# Patient Record
Sex: FEMALE | Race: WHITE | URBAN - METROPOLITAN AREA
[De-identification: names, ages, dates, MRNs, and addresses within clinical notes are randomized per-mention and may not be internally consistent; named-entity substitution may affect disease eponyms.]

---
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
## 2024-02-03 ENCOUNTER — HOSPITAL ENCOUNTER (EMERGENCY)
Age: 24
Discharge: HOME | End: 2024-02-03
Payer: MEDICAID

## 2024-02-03 VITALS
TEMPERATURE: 99.2 F | OXYGEN SATURATION: 97 % | HEART RATE: 101 BPM | WEIGHT: 118 LBS | RESPIRATION RATE: 20 BRPM | BODY MASS INDEX: 20.91 KG/M2 | DIASTOLIC BLOOD PRESSURE: 70 MMHG | SYSTOLIC BLOOD PRESSURE: 107 MMHG | HEIGHT: 63 IN

## 2024-02-03 DIAGNOSIS — J10.1 INFLUENZA A: Primary | ICD-10-CM

## 2024-02-03 LAB
INFLUENZA A BY PCR: POSITIVE
INFLUENZA B BY PCR: NEGATIVE
SARS-COV-2 RDRP RESP QL NAA+PROBE: NOT DETECTED
STREP GRP A PCR: NEGATIVE

## 2024-02-03 PROCEDURE — 87502 INFLUENZA DNA AMP PROBE: CPT

## 2024-02-03 PROCEDURE — 99283 EMERGENCY DEPT VISIT LOW MDM: CPT

## 2024-02-03 PROCEDURE — 87651 STREP A DNA AMP PROBE: CPT

## 2024-02-03 PROCEDURE — 6370000000 HC RX 637 (ALT 250 FOR IP)

## 2024-02-03 PROCEDURE — 87635 SARS-COV-2 COVID-19 AMP PRB: CPT

## 2024-02-03 RX ORDER — ONDANSETRON 4 MG/1
4 TABLET, ORALLY DISINTEGRATING ORAL ONCE
Status: COMPLETED | OUTPATIENT
Start: 2024-02-03 | End: 2024-02-03

## 2024-02-03 RX ORDER — ONDANSETRON 4 MG/1
4 TABLET, ORALLY DISINTEGRATING ORAL EVERY 8 HOURS PRN
Qty: 9 TABLET | Refills: 0 | Status: SHIPPED | OUTPATIENT
Start: 2024-02-03 | End: 2024-02-06

## 2024-02-03 RX ORDER — IBUPROFEN 600 MG/1
600 TABLET ORAL EVERY 8 HOURS PRN
Qty: 9 TABLET | Refills: 0 | Status: SHIPPED | OUTPATIENT
Start: 2024-02-03 | End: 2024-02-06

## 2024-02-03 RX ORDER — IBUPROFEN 600 MG/1
600 TABLET ORAL ONCE
Status: COMPLETED | OUTPATIENT
Start: 2024-02-03 | End: 2024-02-03

## 2024-02-03 RX ORDER — FLUTICASONE PROPIONATE 50 MCG
2 SPRAY, SUSPENSION (ML) NASAL DAILY
Qty: 16 G | Refills: 0 | Status: SHIPPED | OUTPATIENT
Start: 2024-02-03

## 2024-02-03 RX ADMIN — ONDANSETRON 4 MG: 4 TABLET, ORALLY DISINTEGRATING ORAL at 11:51

## 2024-02-03 RX ADMIN — IBUPROFEN 600 MG: 600 TABLET, FILM COATED ORAL at 12:41

## 2024-02-03 ASSESSMENT — PAIN SCALES - GENERAL: PAINLEVEL_OUTOF10: 9

## 2024-02-03 NOTE — ED PROVIDER NOTES
Discharge 02/03/2024 12:34:51 PM      PATIENT REFERRED TO:  Daniel Archuleta PA  [address] [id number] [phone]            DISCHARGE MEDICATIONS:  New Prescriptions    FLUTICASONE (FLONASE) 50 MCG/ACT NASAL SPRAY    2 sprays by Each Nostril route daily    IBUPROFEN (ADVIL;MOTRIN) 600 MG TABLET    Take 1 tablet by mouth every 8 hours as needed for Pain or Fever    ONDANSETRON (ZOFRAN-ODT) 4 MG DISINTEGRATING TABLET    Take 1 tablet by mouth every 8 hours as needed for Nausea or Vomiting       (Please note that portions of this note were completed with a voice recognition program.  Efforts were made to edit the dictations but occasionally words are mis-transcribed.)    Ninfa Rebolledo, APRN - CNP    Supervising Physician ***      intermittent vomiting, cough, rhinorrhea x head congestion.  Upon assessment, age appropriate responses, no wheezing, no stridor, no nasal flaring noted. She is in no acute distress. Patient medicated in ED.   Patient is influenza positive. Due to symptoms being \"all week\" tamiflu not indicated. Discussed symptom approach. Scripts x 2 sent to pharmacy for Zofran and Flonase. Patient educated on supportive care. Patient given standard anticipatory guidance, return to ED warning signs, strict follow-up guidelines with PCP/Speciality. Patient verbalized understanding of education, instruction. Patient is agreeable to plan. Patient discharged home in stable condition      Medical Decision Making  Amount and/or Complexity of Data Reviewed  Labs: ordered.       Coding     PROCEDURES:    Procedures      FINAL IMPRESSION      1. Influenza A          DISPOSITION/PLAN   DISPOSITION Decision To Discharge 02/03/2024 12:34:51 PM      PATIENT REFERRED TO:  Daniel Archuleta PA  [address] [place], [place] [id number]  [phone]            DISCHARGE MEDICATIONS:  Discharge Medication List as of 2/3/2024 12:35 PM        START taking these medications    Details   ondansetron (ZOFRAN-ODT) 4 MG disintegrating tablet Take 1 tablet by mouth every 8 hours as needed for Nausea or Vomiting, Disp-9 tablet, R-0Normal      ibuprofen (ADVIL;MOTRIN) 600 MG tablet Take 1 tablet by mouth every 8 hours as needed for Pain or Fever, Disp-9 tablet, R-0Normal      fluticasone (FLONASE) 50 MCG/ACT nasal spray 2 sprays by Each Nostril route daily, Disp-16 g, R-0Normal             (Please note that portions of this note were completed with a voice recognition program.  Efforts were made to edit the dictations but occasionally words are mis-transcribed.)    Ninfa Rebolledo, APRN - CNP    Supervising Physician Malorie

## 2024-02-05 ASSESSMENT — ENCOUNTER SYMPTOMS
TROUBLE SWALLOWING: 0
SHORTNESS OF BREATH: 1
VOICE CHANGE: 0
RHINORRHEA: 1
COUGH: 1
COLOR CHANGE: 0
STRIDOR: 0
SORE THROAT: 1
BACK PAIN: 0
WHEEZING: 0
NAUSEA: 1
VOMITING: 1

## 2024-08-31 ENCOUNTER — APPOINTMENT (OUTPATIENT)
Dept: RADIOLOGY | Facility: HOSPITAL | Age: 24
End: 2024-08-31
Payer: MEDICAID

## 2024-08-31 ENCOUNTER — HOSPITAL ENCOUNTER (EMERGENCY)
Facility: HOSPITAL | Age: 24
Discharge: OTHER INSTITUTION | End: 2024-09-01
Attending: STUDENT IN AN ORGANIZED HEALTH CARE EDUCATION/TRAINING PROGRAM
Payer: MEDICAID

## 2024-08-31 VITALS
RESPIRATION RATE: 18 BRPM | BODY MASS INDEX: 19.14 KG/M2 | HEIGHT: 63 IN | TEMPERATURE: 98.8 F | WEIGHT: 108 LBS | SYSTOLIC BLOOD PRESSURE: 146 MMHG | HEART RATE: 108 BPM | DIASTOLIC BLOOD PRESSURE: 79 MMHG | OXYGEN SATURATION: 100 %
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
## 2024-08-31 DIAGNOSIS — S32.592A CLOSED BILATERAL FRACTURE OF PUBIC RAMI, INITIAL ENCOUNTER (MULTI): ICD-10-CM

## 2024-08-31 DIAGNOSIS — S32.591A CLOSED BILATERAL FRACTURE OF PUBIC RAMI, INITIAL ENCOUNTER (MULTI): ICD-10-CM

## 2024-08-31 DIAGNOSIS — S32.10XA CLOSED FRACTURE OF SACRUM, UNSPECIFIED PORTION OF SACRUM, INITIAL ENCOUNTER (MULTI): ICD-10-CM

## 2024-08-31 LAB
ABO GROUP (TYPE) IN BLOOD: NORMAL
ALBUMIN SERPL BCP-MCNC: 4.2 G/DL (ref 3.4–5)
ALP SERPL-CCNC: 35 U/L (ref 33–110)
ALT SERPL W P-5'-P-CCNC: 9 U/L (ref 7–45)
ANION GAP SERPL CALC-SCNC: 11 MMOL/L (ref 10–20)
ANTIBODY SCREEN: NORMAL
AST SERPL W P-5'-P-CCNC: 15 U/L (ref 9–39)
B-HCG SERPL-ACNC: 338 MIU/ML
BASOPHILS # BLD AUTO: 0.03 X10*3/UL (ref 0–0.1)
BASOPHILS NFR BLD AUTO: 0.2 %
BILIRUB SERPL-MCNC: 0.4 MG/DL (ref 0–1.2)
BUN SERPL-MCNC: 12 MG/DL (ref 6–23)
CALCIUM SERPL-MCNC: 8.8 MG/DL (ref 8.6–10.3)
CHLORIDE SERPL-SCNC: 106 MMOL/L (ref 98–107)
CO2 SERPL-SCNC: 23 MMOL/L (ref 21–32)
CREAT SERPL-MCNC: 0.67 MG/DL (ref 0.5–1.05)
EGFRCR SERPLBLD CKD-EPI 2021: >90 ML/MIN/1.73M*2
EOSINOPHIL # BLD AUTO: 0.05 X10*3/UL (ref 0–0.7)
EOSINOPHIL NFR BLD AUTO: 0.4 %
ERYTHROCYTE [DISTWIDTH] IN BLOOD BY AUTOMATED COUNT: 11.9 % (ref 11.5–14.5)
ETHANOL SERPL-MCNC: <10 MG/DL
GLUCOSE SERPL-MCNC: 155 MG/DL (ref 74–99)
HCT VFR BLD AUTO: 39.2 % (ref 36–46)
HGB BLD-MCNC: 13.5 G/DL (ref 12–16)
HOLD SPECIMEN: NORMAL
IMM GRANULOCYTES # BLD AUTO: 0.14 X10*3/UL (ref 0–0.7)
IMM GRANULOCYTES NFR BLD AUTO: 1.1 % (ref 0–0.9)
INR PPP: 1.1 (ref 0.9–1.1)
LACTATE SERPL-SCNC: 1.6 MMOL/L (ref 0.4–2)
LACTATE SERPL-SCNC: 2.1 MMOL/L (ref 0.4–2)
LYMPHOCYTES # BLD AUTO: 3.44 X10*3/UL (ref 1.2–4.8)
LYMPHOCYTES NFR BLD AUTO: 26.9 %
MCH RBC QN AUTO: 31.3 PG (ref 26–34)
MCHC RBC AUTO-ENTMCNC: 34.4 G/DL (ref 32–36)
MCV RBC AUTO: 91 FL (ref 80–100)
MONOCYTES # BLD AUTO: 0.88 X10*3/UL (ref 0.1–1)
MONOCYTES NFR BLD AUTO: 6.9 %
NEUTROPHILS # BLD AUTO: 8.27 X10*3/UL (ref 1.2–7.7)
NEUTROPHILS NFR BLD AUTO: 64.5 %
NRBC BLD-RTO: 0 /100 WBCS (ref 0–0)
PLATELET # BLD AUTO: 220 X10*3/UL (ref 150–450)
POTASSIUM SERPL-SCNC: 3.3 MMOL/L (ref 3.5–5.3)
PROT SERPL-MCNC: 6.7 G/DL (ref 6.4–8.2)
PROTHROMBIN TIME: 12.8 SECONDS (ref 9.8–12.8)
RBC # BLD AUTO: 4.32 X10*6/UL (ref 4–5.2)
RH FACTOR (ANTIGEN D): NORMAL
SODIUM SERPL-SCNC: 137 MMOL/L (ref 136–145)
WBC # BLD AUTO: 12.8 X10*3/UL (ref 4.4–11.3)

## 2024-08-31 PROCEDURE — 83605 ASSAY OF LACTIC ACID: CPT | Performed by: STUDENT IN AN ORGANIZED HEALTH CARE EDUCATION/TRAINING PROGRAM

## 2024-08-31 PROCEDURE — 72125 CT NECK SPINE W/O DYE: CPT | Performed by: RADIOLOGY

## 2024-08-31 PROCEDURE — 99291 CRITICAL CARE FIRST HOUR: CPT | Performed by: STUDENT IN AN ORGANIZED HEALTH CARE EDUCATION/TRAINING PROGRAM

## 2024-08-31 PROCEDURE — 72128 CT CHEST SPINE W/O DYE: CPT | Mod: RCN

## 2024-08-31 PROCEDURE — 73502 X-RAY EXAM HIP UNI 2-3 VIEWS: CPT | Mod: RT

## 2024-08-31 PROCEDURE — 84702 CHORIONIC GONADOTROPIN TEST: CPT | Performed by: STUDENT IN AN ORGANIZED HEALTH CARE EDUCATION/TRAINING PROGRAM

## 2024-08-31 PROCEDURE — 36415 COLL VENOUS BLD VENIPUNCTURE: CPT | Performed by: STUDENT IN AN ORGANIZED HEALTH CARE EDUCATION/TRAINING PROGRAM

## 2024-08-31 PROCEDURE — 74178 CT ABD&PLV WO CNTR FLWD CNTR: CPT | Performed by: STUDENT IN AN ORGANIZED HEALTH CARE EDUCATION/TRAINING PROGRAM

## 2024-08-31 PROCEDURE — 96365 THER/PROPH/DIAG IV INF INIT: CPT | Mod: 59 | Performed by: STUDENT IN AN ORGANIZED HEALTH CARE EDUCATION/TRAINING PROGRAM

## 2024-08-31 PROCEDURE — 71260 CT THORAX DX C+: CPT | Performed by: STUDENT IN AN ORGANIZED HEALTH CARE EDUCATION/TRAINING PROGRAM

## 2024-08-31 PROCEDURE — 74177 CT ABD & PELVIS W/CONTRAST: CPT

## 2024-08-31 PROCEDURE — 2500000004 HC RX 250 GENERAL PHARMACY W/ HCPCS (ALT 636 FOR OP/ED): Performed by: STUDENT IN AN ORGANIZED HEALTH CARE EDUCATION/TRAINING PROGRAM

## 2024-08-31 PROCEDURE — 72131 CT LUMBAR SPINE W/O DYE: CPT | Performed by: STUDENT IN AN ORGANIZED HEALTH CARE EDUCATION/TRAINING PROGRAM

## 2024-08-31 PROCEDURE — 71045 X-RAY EXAM CHEST 1 VIEW: CPT | Performed by: STUDENT IN AN ORGANIZED HEALTH CARE EDUCATION/TRAINING PROGRAM

## 2024-08-31 PROCEDURE — 71045 X-RAY EXAM CHEST 1 VIEW: CPT

## 2024-08-31 PROCEDURE — 76377 3D RENDER W/INTRP POSTPROCES: CPT

## 2024-08-31 PROCEDURE — 72192 CT PELVIS W/O DYE: CPT | Mod: RCN

## 2024-08-31 PROCEDURE — 76377 3D RENDER W/INTRP POSTPROCES: CPT | Performed by: STUDENT IN AN ORGANIZED HEALTH CARE EDUCATION/TRAINING PROGRAM

## 2024-08-31 PROCEDURE — 80053 COMPREHEN METABOLIC PANEL: CPT | Performed by: STUDENT IN AN ORGANIZED HEALTH CARE EDUCATION/TRAINING PROGRAM

## 2024-08-31 PROCEDURE — 85025 COMPLETE CBC W/AUTO DIFF WBC: CPT | Performed by: STUDENT IN AN ORGANIZED HEALTH CARE EDUCATION/TRAINING PROGRAM

## 2024-08-31 PROCEDURE — 70450 CT HEAD/BRAIN W/O DYE: CPT

## 2024-08-31 PROCEDURE — 72125 CT NECK SPINE W/O DYE: CPT

## 2024-08-31 PROCEDURE — 70450 CT HEAD/BRAIN W/O DYE: CPT | Performed by: RADIOLOGY

## 2024-08-31 PROCEDURE — G0390 TRAUMA RESPONS W/HOSP CRITI: HCPCS

## 2024-08-31 PROCEDURE — 86901 BLOOD TYPING SEROLOGIC RH(D): CPT | Performed by: STUDENT IN AN ORGANIZED HEALTH CARE EDUCATION/TRAINING PROGRAM

## 2024-08-31 PROCEDURE — 2550000001 HC RX 255 CONTRASTS: Performed by: STUDENT IN AN ORGANIZED HEALTH CARE EDUCATION/TRAINING PROGRAM

## 2024-08-31 PROCEDURE — 72128 CT CHEST SPINE W/O DYE: CPT | Performed by: STUDENT IN AN ORGANIZED HEALTH CARE EDUCATION/TRAINING PROGRAM

## 2024-08-31 PROCEDURE — 82077 ASSAY SPEC XCP UR&BREATH IA: CPT | Performed by: STUDENT IN AN ORGANIZED HEALTH CARE EDUCATION/TRAINING PROGRAM

## 2024-08-31 PROCEDURE — 72131 CT LUMBAR SPINE W/O DYE: CPT | Mod: RCN

## 2024-08-31 PROCEDURE — 96376 TX/PRO/DX INJ SAME DRUG ADON: CPT | Mod: 59 | Performed by: STUDENT IN AN ORGANIZED HEALTH CARE EDUCATION/TRAINING PROGRAM

## 2024-08-31 PROCEDURE — 73502 X-RAY EXAM HIP UNI 2-3 VIEWS: CPT | Mod: RIGHT SIDE | Performed by: STUDENT IN AN ORGANIZED HEALTH CARE EDUCATION/TRAINING PROGRAM

## 2024-08-31 PROCEDURE — 85610 PROTHROMBIN TIME: CPT | Performed by: STUDENT IN AN ORGANIZED HEALTH CARE EDUCATION/TRAINING PROGRAM

## 2024-08-31 PROCEDURE — 96375 TX/PRO/DX INJ NEW DRUG ADDON: CPT | Mod: 59 | Performed by: STUDENT IN AN ORGANIZED HEALTH CARE EDUCATION/TRAINING PROGRAM

## 2024-08-31 PROCEDURE — 72170 X-RAY EXAM OF PELVIS: CPT

## 2024-08-31 RX ORDER — FENTANYL CITRATE 50 UG/ML
INJECTION, SOLUTION INTRAMUSCULAR; INTRAVENOUS
Status: COMPLETED
Start: 2024-08-31 | End: 2024-08-31

## 2024-08-31 RX ORDER — HYDROMORPHONE HYDROCHLORIDE 1 MG/ML
1 INJECTION, SOLUTION INTRAMUSCULAR; INTRAVENOUS; SUBCUTANEOUS ONCE
Status: COMPLETED | OUTPATIENT
Start: 2024-08-31 | End: 2024-08-31

## 2024-08-31 RX ORDER — FENTANYL CITRATE 50 UG/ML
50 INJECTION, SOLUTION INTRAMUSCULAR; INTRAVENOUS ONCE
Status: COMPLETED | OUTPATIENT
Start: 2024-08-31 | End: 2024-08-31

## 2024-08-31 ASSESSMENT — PAIN DESCRIPTION - LOCATION: LOCATION: HIP

## 2024-08-31 ASSESSMENT — LIFESTYLE VARIABLES
TOTAL SCORE: 0
EVER HAD A DRINK FIRST THING IN THE MORNING TO STEADY YOUR NERVES TO GET RID OF A HANGOVER: NO
HAVE YOU EVER FELT YOU SHOULD CUT DOWN ON YOUR DRINKING: NO
EVER FELT BAD OR GUILTY ABOUT YOUR DRINKING: NO
HAVE PEOPLE ANNOYED YOU BY CRITICIZING YOUR DRINKING: NO

## 2024-08-31 ASSESSMENT — PAIN DESCRIPTION - PAIN TYPE: TYPE: ACUTE PAIN

## 2024-08-31 ASSESSMENT — PAIN SCALES - GENERAL
PAINLEVEL_OUTOF10: 10 - WORST POSSIBLE PAIN

## 2024-08-31 ASSESSMENT — PAIN - FUNCTIONAL ASSESSMENT: PAIN_FUNCTIONAL_ASSESSMENT: 0-10

## 2024-08-31 ASSESSMENT — PAIN DESCRIPTION - ORIENTATION: ORIENTATION: RIGHT

## 2024-09-01 ENCOUNTER — APPOINTMENT (OUTPATIENT)
Dept: RADIOLOGY | Facility: HOSPITAL | Age: 24
End: 2024-09-01
Payer: MEDICAID

## 2024-09-01 ENCOUNTER — ANESTHESIA EVENT (OUTPATIENT)
Dept: OPERATING ROOM | Facility: HOSPITAL | Age: 24
End: 2024-09-01
Payer: MEDICAID

## 2024-09-01 ENCOUNTER — HOSPITAL ENCOUNTER (INPATIENT)
Facility: HOSPITAL | Age: 24
End: 2024-09-01
Attending: EMERGENCY MEDICINE | Admitting: SURGERY
Payer: MEDICAID

## 2024-09-01 ENCOUNTER — CLINICAL SUPPORT (OUTPATIENT)
Dept: EMERGENCY MEDICINE | Facility: HOSPITAL | Age: 24
End: 2024-09-01
Payer: MEDICAID

## 2024-09-01 ENCOUNTER — ANESTHESIA (OUTPATIENT)
Dept: OPERATING ROOM | Facility: HOSPITAL | Age: 24
End: 2024-09-01
Payer: MEDICAID

## 2024-09-01 VITALS
OXYGEN SATURATION: 100 % | WEIGHT: 108 LBS | HEIGHT: 63 IN | TEMPERATURE: 99 F | BODY MASS INDEX: 19.14 KG/M2 | SYSTOLIC BLOOD PRESSURE: 133 MMHG | RESPIRATION RATE: 14 BRPM | HEART RATE: 83 BPM | DIASTOLIC BLOOD PRESSURE: 76 MMHG

## 2024-09-01 DIAGNOSIS — S32.810A PELVIC RING FRACTURE, CLOSED, INITIAL ENCOUNTER (MULTI): ICD-10-CM

## 2024-09-01 DIAGNOSIS — S32.9XXA CLOSED DISPLACED FRACTURE OF PELVIS, UNSPECIFIED PART OF PELVIS, INITIAL ENCOUNTER (MULTI): Primary | ICD-10-CM

## 2024-09-01 PROBLEM — O36.80X0 PREGNANCY OF UNKNOWN ANATOMIC LOCATION (HHS-HCC): Status: ACTIVE | Noted: 2024-09-01

## 2024-09-01 LAB
ABO GROUP (TYPE) IN BLOOD: NORMAL
ALBUMIN SERPL BCP-MCNC: 4.1 G/DL (ref 3.4–5)
ALP SERPL-CCNC: 33 U/L (ref 33–110)
ALT SERPL W P-5'-P-CCNC: 11 U/L (ref 7–45)
ANION GAP SERPL CALC-SCNC: 13 MMOL/L (ref 10–20)
ANTIBODY SCREEN: NORMAL
AST SERPL W P-5'-P-CCNC: 16 U/L (ref 9–39)
ATRIAL RATE: 96 BPM
BASOPHILS # BLD AUTO: 0.03 X10*3/UL (ref 0–0.1)
BASOPHILS NFR BLD AUTO: 0.2 %
BILIRUB SERPL-MCNC: 0.7 MG/DL (ref 0–1.2)
BUN SERPL-MCNC: 11 MG/DL (ref 6–23)
CALCIUM SERPL-MCNC: 8.7 MG/DL (ref 8.6–10.6)
CHLORIDE SERPL-SCNC: 107 MMOL/L (ref 98–107)
CO2 SERPL-SCNC: 22 MMOL/L (ref 21–32)
CREAT SERPL-MCNC: 0.52 MG/DL (ref 0.5–1.05)
EGFRCR SERPLBLD CKD-EPI 2021: >90 ML/MIN/1.73M*2
EOSINOPHIL # BLD AUTO: 0 X10*3/UL (ref 0–0.7)
EOSINOPHIL NFR BLD AUTO: 0 %
ERYTHROCYTE [DISTWIDTH] IN BLOOD BY AUTOMATED COUNT: 11.9 % (ref 11.5–14.5)
ERYTHROCYTE [DISTWIDTH] IN BLOOD BY AUTOMATED COUNT: 12 % (ref 11.5–14.5)
GLUCOSE SERPL-MCNC: 115 MG/DL (ref 74–99)
HCT VFR BLD AUTO: 31 % (ref 36–46)
HCT VFR BLD AUTO: 34.4 % (ref 36–46)
HGB BLD-MCNC: 11 G/DL (ref 12–16)
HGB BLD-MCNC: 12.3 G/DL (ref 12–16)
IMM GRANULOCYTES # BLD AUTO: 0.1 X10*3/UL (ref 0–0.7)
IMM GRANULOCYTES NFR BLD AUTO: 0.7 % (ref 0–0.9)
INR PPP: 1.2 (ref 0.9–1.1)
LACTATE SERPL-SCNC: 1.4 MMOL/L (ref 0.4–2)
LYMPHOCYTES # BLD AUTO: 1.03 X10*3/UL (ref 1.2–4.8)
LYMPHOCYTES NFR BLD AUTO: 7 %
MCH RBC QN AUTO: 30.5 PG (ref 26–34)
MCH RBC QN AUTO: 30.9 PG (ref 26–34)
MCHC RBC AUTO-ENTMCNC: 35.5 G/DL (ref 32–36)
MCHC RBC AUTO-ENTMCNC: 35.8 G/DL (ref 32–36)
MCV RBC AUTO: 85 FL (ref 80–100)
MCV RBC AUTO: 87 FL (ref 80–100)
MONOCYTES # BLD AUTO: 1.29 X10*3/UL (ref 0.1–1)
MONOCYTES NFR BLD AUTO: 8.8 %
NEUTROPHILS # BLD AUTO: 12.28 X10*3/UL (ref 1.2–7.7)
NEUTROPHILS NFR BLD AUTO: 83.3 %
NRBC BLD-RTO: 0 /100 WBCS (ref 0–0)
NRBC BLD-RTO: 0 /100 WBCS (ref 0–0)
P AXIS: 43 DEGREES
P OFFSET: 204 MS
P ONSET: 152 MS
PLATELET # BLD AUTO: 195 X10*3/UL (ref 150–450)
PLATELET # BLD AUTO: 222 X10*3/UL (ref 150–450)
POTASSIUM SERPL-SCNC: 3.6 MMOL/L (ref 3.5–5.3)
PR INTERVAL: 132 MS
PROT SERPL-MCNC: 6.4 G/DL (ref 6.4–8.2)
PROTHROMBIN TIME: 13.5 SECONDS (ref 9.8–12.8)
Q ONSET: 218 MS
QRS COUNT: 16 BEATS
QRS DURATION: 88 MS
QT INTERVAL: 374 MS
QTC CALCULATION(BAZETT): 472 MS
QTC FREDERICIA: 437 MS
R AXIS: 78 DEGREES
RBC # BLD AUTO: 3.56 X10*6/UL (ref 4–5.2)
RBC # BLD AUTO: 4.03 X10*6/UL (ref 4–5.2)
RH FACTOR (ANTIGEN D): NORMAL
SODIUM SERPL-SCNC: 138 MMOL/L (ref 136–145)
T AXIS: 27 DEGREES
T OFFSET: 405 MS
VENTRICULAR RATE: 96 BPM
WBC # BLD AUTO: 10.5 X10*3/UL (ref 4.4–11.3)
WBC # BLD AUTO: 14.7 X10*3/UL (ref 4.4–11.3)

## 2024-09-01 PROCEDURE — 93005 ELECTROCARDIOGRAM TRACING: CPT

## 2024-09-01 PROCEDURE — 0QS134Z REPOSITION SACRUM WITH INTERNAL FIXATION DEVICE, PERCUTANEOUS APPROACH: ICD-10-PCS | Performed by: STUDENT IN AN ORGANIZED HEALTH CARE EDUCATION/TRAINING PROGRAM

## 2024-09-01 PROCEDURE — 2500000005 HC RX 250 GENERAL PHARMACY W/O HCPCS

## 2024-09-01 PROCEDURE — 0QH135Z INSERTION OF EXTERNAL FIXATION DEVICE INTO SACRUM, PERCUTANEOUS APPROACH: ICD-10-PCS | Performed by: STUDENT IN AN ORGANIZED HEALTH CARE EDUCATION/TRAINING PROGRAM

## 2024-09-01 PROCEDURE — 3600000009 HC OR TIME - EACH INCREMENTAL 1 MINUTE - PROCEDURE LEVEL FOUR: Performed by: STUDENT IN AN ORGANIZED HEALTH CARE EDUCATION/TRAINING PROGRAM

## 2024-09-01 PROCEDURE — 2W5NX0Z REMOVAL OF TRACTION APPARATUS ON RIGHT UPPER LEG: ICD-10-PCS | Performed by: STUDENT IN AN ORGANIZED HEALTH CARE EDUCATION/TRAINING PROGRAM

## 2024-09-01 PROCEDURE — 0QS234Z REPOSITION RIGHT PELVIC BONE WITH INTERNAL FIXATION DEVICE, PERCUTANEOUS APPROACH: ICD-10-PCS | Performed by: STUDENT IN AN ORGANIZED HEALTH CARE EDUCATION/TRAINING PROGRAM

## 2024-09-01 PROCEDURE — 20650 INSERT AND REMOVE BONE PIN: CPT | Performed by: STUDENT IN AN ORGANIZED HEALTH CARE EDUCATION/TRAINING PROGRAM

## 2024-09-01 PROCEDURE — A6213 FOAM DRG >16<=48 SQ IN W/BDR: HCPCS | Performed by: STUDENT IN AN ORGANIZED HEALTH CARE EDUCATION/TRAINING PROGRAM

## 2024-09-01 PROCEDURE — 73560 X-RAY EXAM OF KNEE 1 OR 2: CPT | Mod: RT

## 2024-09-01 PROCEDURE — 85025 COMPLETE CBC W/AUTO DIFF WBC: CPT

## 2024-09-01 PROCEDURE — 0QS334Z REPOSITION LEFT PELVIC BONE WITH INTERNAL FIXATION DEVICE, PERCUTANEOUS APPROACH: ICD-10-PCS | Performed by: STUDENT IN AN ORGANIZED HEALTH CARE EDUCATION/TRAINING PROGRAM

## 2024-09-01 PROCEDURE — 96376 TX/PRO/DX INJ SAME DRUG ADON: CPT | Mod: 59

## 2024-09-01 PROCEDURE — 2500000004 HC RX 250 GENERAL PHARMACY W/ HCPCS (ALT 636 FOR OP/ED)

## 2024-09-01 PROCEDURE — 27216 TREAT PELVIC RING FRACTURE: CPT | Performed by: STUDENT IN AN ORGANIZED HEALTH CARE EDUCATION/TRAINING PROGRAM

## 2024-09-01 PROCEDURE — C1776 JOINT DEVICE (IMPLANTABLE): HCPCS | Performed by: STUDENT IN AN ORGANIZED HEALTH CARE EDUCATION/TRAINING PROGRAM

## 2024-09-01 PROCEDURE — 72170 X-RAY EXAM OF PELVIS: CPT

## 2024-09-01 PROCEDURE — 3700000001 HC GENERAL ANESTHESIA TIME - INITIAL BASE CHARGE: Performed by: STUDENT IN AN ORGANIZED HEALTH CARE EDUCATION/TRAINING PROGRAM

## 2024-09-01 PROCEDURE — 2500000001 HC RX 250 WO HCPCS SELF ADMINISTERED DRUGS (ALT 637 FOR MEDICARE OP): Performed by: ANESTHESIOLOGY

## 2024-09-01 PROCEDURE — 2500000004 HC RX 250 GENERAL PHARMACY W/ HCPCS (ALT 636 FOR OP/ED): Performed by: NURSE PRACTITIONER

## 2024-09-01 PROCEDURE — 80053 COMPREHEN METABOLIC PANEL: CPT

## 2024-09-01 PROCEDURE — 0QS235Z REPOSITION RIGHT PELVIC BONE WITH EXTERNAL FIXATION DEVICE, PERCUTANEOUS APPROACH: ICD-10-PCS | Performed by: EMERGENCY MEDICINE

## 2024-09-01 PROCEDURE — 99291 CRITICAL CARE FIRST HOUR: CPT | Mod: 25 | Performed by: EMERGENCY MEDICINE

## 2024-09-01 PROCEDURE — A27216 PR PERCUT FIX POST PELV RING FX

## 2024-09-01 PROCEDURE — 99253 IP/OBS CNSLTJ NEW/EST LOW 45: CPT

## 2024-09-01 PROCEDURE — 83605 ASSAY OF LACTIC ACID: CPT

## 2024-09-01 PROCEDURE — 2500000004 HC RX 250 GENERAL PHARMACY W/ HCPCS (ALT 636 FOR OP/ED): Performed by: STUDENT IN AN ORGANIZED HEALTH CARE EDUCATION/TRAINING PROGRAM

## 2024-09-01 PROCEDURE — C1713 ANCHOR/SCREW BN/BN,TIS/BN: HCPCS | Performed by: STUDENT IN AN ORGANIZED HEALTH CARE EDUCATION/TRAINING PROGRAM

## 2024-09-01 PROCEDURE — 99291 CRITICAL CARE FIRST HOUR: CPT | Performed by: NURSE PRACTITIONER

## 2024-09-01 PROCEDURE — 7100000002 HC RECOVERY ROOM TIME - EACH INCREMENTAL 1 MINUTE: Performed by: STUDENT IN AN ORGANIZED HEALTH CARE EDUCATION/TRAINING PROGRAM

## 2024-09-01 PROCEDURE — 0QH335Z INSERTION OF EXTERNAL FIXATION DEVICE INTO LEFT PELVIC BONE, PERCUTANEOUS APPROACH: ICD-10-PCS | Performed by: STUDENT IN AN ORGANIZED HEALTH CARE EDUCATION/TRAINING PROGRAM

## 2024-09-01 PROCEDURE — 73523 X-RAY EXAM HIPS BI 5/> VIEWS: CPT

## 2024-09-01 PROCEDURE — 85610 PROTHROMBIN TIME: CPT

## 2024-09-01 PROCEDURE — G0390 TRAUMA RESPONS W/HOSP CRITI: HCPCS

## 2024-09-01 PROCEDURE — 2500000005 HC RX 250 GENERAL PHARMACY W/O HCPCS: Performed by: ANESTHESIOLOGIST ASSISTANT

## 2024-09-01 PROCEDURE — 2500000004 HC RX 250 GENERAL PHARMACY W/ HCPCS (ALT 636 FOR OP/ED): Mod: SE

## 2024-09-01 PROCEDURE — 2720000007 HC OR 272 NO HCPCS: Performed by: STUDENT IN AN ORGANIZED HEALTH CARE EDUCATION/TRAINING PROGRAM

## 2024-09-01 PROCEDURE — 0QH235Z INSERTION OF EXTERNAL FIXATION DEVICE INTO RIGHT PELVIC BONE, PERCUTANEOUS APPROACH: ICD-10-PCS | Performed by: STUDENT IN AN ORGANIZED HEALTH CARE EDUCATION/TRAINING PROGRAM

## 2024-09-01 PROCEDURE — 20692 APPL MLTPLN UNI EXT FIXJ SYS: CPT | Performed by: STUDENT IN AN ORGANIZED HEALTH CARE EDUCATION/TRAINING PROGRAM

## 2024-09-01 PROCEDURE — 3700000002 HC GENERAL ANESTHESIA TIME - EACH INCREMENTAL 1 MINUTE: Performed by: STUDENT IN AN ORGANIZED HEALTH CARE EDUCATION/TRAINING PROGRAM

## 2024-09-01 PROCEDURE — 2500000001 HC RX 250 WO HCPCS SELF ADMINISTERED DRUGS (ALT 637 FOR MEDICARE OP): Performed by: NURSE PRACTITIONER

## 2024-09-01 PROCEDURE — 2500000005 HC RX 250 GENERAL PHARMACY W/O HCPCS: Performed by: STUDENT IN AN ORGANIZED HEALTH CARE EDUCATION/TRAINING PROGRAM

## 2024-09-01 PROCEDURE — 36620 INSERTION CATHETER ARTERY: CPT | Performed by: ANESTHESIOLOGY

## 2024-09-01 PROCEDURE — 36415 COLL VENOUS BLD VENIPUNCTURE: CPT

## 2024-09-01 PROCEDURE — 85027 COMPLETE CBC AUTOMATED: CPT | Performed by: NURSE PRACTITIONER

## 2024-09-01 PROCEDURE — 86901 BLOOD TYPING SEROLOGIC RH(D): CPT | Performed by: NURSE PRACTITIONER

## 2024-09-01 PROCEDURE — 3600000004 HC OR TIME - INITIAL BASE CHARGE - PROCEDURE LEVEL FOUR: Performed by: STUDENT IN AN ORGANIZED HEALTH CARE EDUCATION/TRAINING PROGRAM

## 2024-09-01 PROCEDURE — 2500000004 HC RX 250 GENERAL PHARMACY W/ HCPCS (ALT 636 FOR OP/ED): Mod: JZ

## 2024-09-01 PROCEDURE — 1100000001 HC PRIVATE ROOM DAILY

## 2024-09-01 PROCEDURE — 2500000004 HC RX 250 GENERAL PHARMACY W/ HCPCS (ALT 636 FOR OP/ED): Performed by: ANESTHESIOLOGIST ASSISTANT

## 2024-09-01 PROCEDURE — 7100000001 HC RECOVERY ROOM TIME - INITIAL BASE CHARGE: Performed by: STUDENT IN AN ORGANIZED HEALTH CARE EDUCATION/TRAINING PROGRAM

## 2024-09-01 PROCEDURE — 2780000003 HC OR 278 NO HCPCS: Performed by: STUDENT IN AN ORGANIZED HEALTH CARE EDUCATION/TRAINING PROGRAM

## 2024-09-01 PROCEDURE — 36415 COLL VENOUS BLD VENIPUNCTURE: CPT | Performed by: NURSE PRACTITIONER

## 2024-09-01 PROCEDURE — 96374 THER/PROPH/DIAG INJ IV PUSH: CPT | Mod: 59

## 2024-09-01 PROCEDURE — A27216 PR PERCUT FIX POST PELV RING FX: Performed by: ANESTHESIOLOGY

## 2024-09-01 PROCEDURE — 51702 INSERT TEMP BLADDER CATH: CPT

## 2024-09-01 PROCEDURE — 96375 TX/PRO/DX INJ NEW DRUG ADDON: CPT | Mod: 59

## 2024-09-01 PROCEDURE — 99291 CRITICAL CARE FIRST HOUR: CPT | Performed by: EMERGENCY MEDICINE

## 2024-09-01 PROCEDURE — 2500000004 HC RX 250 GENERAL PHARMACY W/ HCPCS (ALT 636 FOR OP/ED): Performed by: ANESTHESIOLOGY

## 2024-09-01 DEVICE — ROD, CONNECTING 11 X 250 HOFFMANN3: Type: IMPLANTABLE DEVICE | Site: PELVIS | Status: FUNCTIONAL

## 2024-09-01 DEVICE — CAP, PROTECTIVE 5MM BLUE: Type: IMPLANTABLE DEVICE | Site: PELVIS | Status: FUNCTIONAL

## 2024-09-01 DEVICE — IMPLANTABLE DEVICE: Type: IMPLANTABLE DEVICE | Site: PELVIS | Status: FUNCTIONAL

## 2024-09-01 DEVICE — WASHER, F/6.5/8.0 SCREW SS: Type: IMPLANTABLE DEVICE | Site: PELVIS | Status: FUNCTIONAL

## 2024-09-01 RX ORDER — MIDAZOLAM HYDROCHLORIDE 1 MG/ML
1 INJECTION INTRAMUSCULAR; INTRAVENOUS ONCE
Status: COMPLETED | OUTPATIENT
Start: 2024-09-01 | End: 2024-09-01

## 2024-09-01 RX ORDER — ONDANSETRON HYDROCHLORIDE 2 MG/ML
4 INJECTION, SOLUTION INTRAVENOUS ONCE
Status: COMPLETED | OUTPATIENT
Start: 2024-09-01 | End: 2024-09-01

## 2024-09-01 RX ORDER — SODIUM CHLORIDE, SODIUM LACTATE, POTASSIUM CHLORIDE, CALCIUM CHLORIDE 600; 310; 30; 20 MG/100ML; MG/100ML; MG/100ML; MG/100ML
100 INJECTION, SOLUTION INTRAVENOUS CONTINUOUS
Status: DISCONTINUED | OUTPATIENT
Start: 2024-09-01 | End: 2024-09-01 | Stop reason: HOSPADM

## 2024-09-01 RX ORDER — DIPHENHYDRAMINE HYDROCHLORIDE 50 MG/ML
12.5 INJECTION INTRAMUSCULAR; INTRAVENOUS ONCE AS NEEDED
Status: DISCONTINUED | OUTPATIENT
Start: 2024-09-01 | End: 2024-09-01 | Stop reason: HOSPADM

## 2024-09-01 RX ORDER — CEFAZOLIN 1 G/1
INJECTION, POWDER, FOR SOLUTION INTRAVENOUS AS NEEDED
Status: DISCONTINUED | OUTPATIENT
Start: 2024-09-01 | End: 2024-09-01

## 2024-09-01 RX ORDER — NEOSTIGMINE METHYLSULFATE 1 MG/ML
INJECTION INTRAVENOUS AS NEEDED
Status: DISCONTINUED | OUTPATIENT
Start: 2024-09-01 | End: 2024-09-01

## 2024-09-01 RX ORDER — HYDROMORPHONE HYDROCHLORIDE 1 MG/ML
1 INJECTION, SOLUTION INTRAMUSCULAR; INTRAVENOUS; SUBCUTANEOUS ONCE
Status: COMPLETED | OUTPATIENT
Start: 2024-09-01 | End: 2024-09-01

## 2024-09-01 RX ORDER — SODIUM CHLORIDE, SODIUM LACTATE, POTASSIUM CHLORIDE, CALCIUM CHLORIDE 600; 310; 30; 20 MG/100ML; MG/100ML; MG/100ML; MG/100ML
75 INJECTION, SOLUTION INTRAVENOUS CONTINUOUS
Status: DISCONTINUED | OUTPATIENT
Start: 2024-09-01 | End: 2024-09-03

## 2024-09-01 RX ORDER — ONDANSETRON HYDROCHLORIDE 2 MG/ML
4 INJECTION, SOLUTION INTRAVENOUS ONCE AS NEEDED
Status: COMPLETED | OUTPATIENT
Start: 2024-09-01 | End: 2024-09-01

## 2024-09-01 RX ORDER — OXYCODONE HYDROCHLORIDE 5 MG/1
10 TABLET ORAL EVERY 4 HOURS PRN
Status: DISCONTINUED | OUTPATIENT
Start: 2024-09-01 | End: 2024-09-01 | Stop reason: HOSPADM

## 2024-09-01 RX ORDER — FENTANYL CITRATE 50 UG/ML
INJECTION, SOLUTION INTRAMUSCULAR; INTRAVENOUS AS NEEDED
Status: DISCONTINUED | OUTPATIENT
Start: 2024-09-01 | End: 2024-09-01

## 2024-09-01 RX ORDER — FENTANYL CITRATE 50 UG/ML
1 INJECTION, SOLUTION INTRAMUSCULAR; INTRAVENOUS ONCE
Status: COMPLETED | OUTPATIENT
Start: 2024-09-01 | End: 2024-09-01

## 2024-09-01 RX ORDER — HYDROMORPHONE HYDROCHLORIDE 1 MG/ML
0.2 INJECTION, SOLUTION INTRAMUSCULAR; INTRAVENOUS; SUBCUTANEOUS EVERY 4 HOURS PRN
Status: DISCONTINUED | OUTPATIENT
Start: 2024-09-01 | End: 2024-09-02

## 2024-09-01 RX ORDER — OXYCODONE HYDROCHLORIDE 5 MG/1
10 TABLET ORAL EVERY 4 HOURS PRN
Status: DISCONTINUED | OUTPATIENT
Start: 2024-09-01 | End: 2024-09-05

## 2024-09-01 RX ORDER — OXYCODONE HYDROCHLORIDE 5 MG/1
5 TABLET ORAL EVERY 4 HOURS PRN
Status: DISCONTINUED | OUTPATIENT
Start: 2024-09-01 | End: 2024-09-01 | Stop reason: HOSPADM

## 2024-09-01 RX ORDER — AMOXICILLIN 250 MG
2 CAPSULE ORAL NIGHTLY
Status: DISCONTINUED | OUTPATIENT
Start: 2024-09-01 | End: 2024-09-08 | Stop reason: HOSPADM

## 2024-09-01 RX ORDER — PROPOFOL 10 MG/ML
INJECTION, EMULSION INTRAVENOUS AS NEEDED
Status: DISCONTINUED | OUTPATIENT
Start: 2024-09-01 | End: 2024-09-01

## 2024-09-01 RX ORDER — FENTANYL CITRATE 50 UG/ML
50 INJECTION, SOLUTION INTRAMUSCULAR; INTRAVENOUS ONCE
Status: COMPLETED | OUTPATIENT
Start: 2024-09-01 | End: 2024-09-01

## 2024-09-01 RX ORDER — ATROPINE SULFATE 0.1 MG/ML
INJECTION INTRAVENOUS AS NEEDED
Status: DISCONTINUED | OUTPATIENT
Start: 2024-09-01 | End: 2024-09-01

## 2024-09-01 RX ORDER — HYDROMORPHONE HYDROCHLORIDE 1 MG/ML
0.5 INJECTION, SOLUTION INTRAMUSCULAR; INTRAVENOUS; SUBCUTANEOUS ONCE
Status: COMPLETED | OUTPATIENT
Start: 2024-09-01 | End: 2024-09-01

## 2024-09-01 RX ORDER — HYDROMORPHONE HYDROCHLORIDE 1 MG/ML
0.2 INJECTION, SOLUTION INTRAMUSCULAR; INTRAVENOUS; SUBCUTANEOUS EVERY 5 MIN PRN
Status: DISCONTINUED | OUTPATIENT
Start: 2024-09-01 | End: 2024-09-01 | Stop reason: HOSPADM

## 2024-09-01 RX ORDER — ACETAMINOPHEN 325 MG/1
975 TABLET ORAL EVERY 6 HOURS SCHEDULED
Status: DISCONTINUED | OUTPATIENT
Start: 2024-09-01 | End: 2024-09-04

## 2024-09-01 RX ORDER — ENOXAPARIN SODIUM 100 MG/ML
30 INJECTION SUBCUTANEOUS EVERY 12 HOURS
Status: DISCONTINUED | OUTPATIENT
Start: 2024-09-01 | End: 2024-09-08 | Stop reason: HOSPADM

## 2024-09-01 RX ORDER — CEFAZOLIN SODIUM 2 G/100ML
2 INJECTION, SOLUTION INTRAVENOUS EVERY 8 HOURS
Status: COMPLETED | OUTPATIENT
Start: 2024-09-01 | End: 2024-09-02

## 2024-09-01 RX ORDER — ACETAMINOPHEN 325 MG/1
975 TABLET ORAL ONCE
Status: DISCONTINUED | OUTPATIENT
Start: 2024-09-01 | End: 2024-09-01 | Stop reason: HOSPADM

## 2024-09-01 RX ORDER — SODIUM CHLORIDE 0.9 G/100ML
IRRIGANT IRRIGATION AS NEEDED
Status: DISCONTINUED | OUTPATIENT
Start: 2024-09-01 | End: 2024-09-01 | Stop reason: HOSPADM

## 2024-09-01 RX ORDER — LIDOCAINE HYDROCHLORIDE 20 MG/ML
INJECTION, SOLUTION INFILTRATION; PERINEURAL AS NEEDED
Status: DISCONTINUED | OUTPATIENT
Start: 2024-09-01 | End: 2024-09-01

## 2024-09-01 RX ORDER — HYDROMORPHONE HYDROCHLORIDE 1 MG/ML
0.4 INJECTION, SOLUTION INTRAMUSCULAR; INTRAVENOUS; SUBCUTANEOUS
Status: DISCONTINUED | OUTPATIENT
Start: 2024-09-01 | End: 2024-09-04

## 2024-09-01 RX ORDER — ALBUTEROL SULFATE 0.83 MG/ML
2.5 SOLUTION RESPIRATORY (INHALATION) ONCE AS NEEDED
Status: DISCONTINUED | OUTPATIENT
Start: 2024-09-01 | End: 2024-09-01 | Stop reason: HOSPADM

## 2024-09-01 RX ORDER — NALOXONE HYDROCHLORIDE 0.4 MG/ML
0.2 INJECTION, SOLUTION INTRAMUSCULAR; INTRAVENOUS; SUBCUTANEOUS EVERY 5 MIN PRN
Status: DISCONTINUED | OUTPATIENT
Start: 2024-09-01 | End: 2024-09-05

## 2024-09-01 RX ORDER — ROCURONIUM BROMIDE 10 MG/ML
INJECTION, SOLUTION INTRAVENOUS AS NEEDED
Status: DISCONTINUED | OUTPATIENT
Start: 2024-09-01 | End: 2024-09-01

## 2024-09-01 RX ORDER — ACETAMINOPHEN 325 MG/1
650 TABLET ORAL EVERY 4 HOURS PRN
Status: DISCONTINUED | OUTPATIENT
Start: 2024-09-01 | End: 2024-09-01 | Stop reason: HOSPADM

## 2024-09-01 RX ORDER — LIDOCAINE HYDROCHLORIDE 10 MG/ML
0.1 INJECTION, SOLUTION INFILTRATION; PERINEURAL ONCE
Status: DISCONTINUED | OUTPATIENT
Start: 2024-09-01 | End: 2024-09-01 | Stop reason: HOSPADM

## 2024-09-01 RX ORDER — METHOCARBAMOL 500 MG/1
500 TABLET, FILM COATED ORAL EVERY 6 HOURS
Status: DISCONTINUED | OUTPATIENT
Start: 2024-09-01 | End: 2024-09-01

## 2024-09-01 RX ORDER — HYDROMORPHONE HYDROCHLORIDE 1 MG/ML
0.4 INJECTION, SOLUTION INTRAMUSCULAR; INTRAVENOUS; SUBCUTANEOUS EVERY 4 HOURS PRN
Status: DISCONTINUED | OUTPATIENT
Start: 2024-09-01 | End: 2024-09-02

## 2024-09-01 RX ORDER — HYDROMORPHONE HYDROCHLORIDE 1 MG/ML
INJECTION, SOLUTION INTRAMUSCULAR; INTRAVENOUS; SUBCUTANEOUS AS NEEDED
Status: DISCONTINUED | OUTPATIENT
Start: 2024-09-01 | End: 2024-09-01

## 2024-09-01 RX ORDER — TRANEXAMIC ACID 10 MG/ML
INJECTION, SOLUTION INTRAVENOUS AS NEEDED
Status: DISCONTINUED | OUTPATIENT
Start: 2024-09-01 | End: 2024-09-01

## 2024-09-01 RX ORDER — METOCLOPRAMIDE HYDROCHLORIDE 5 MG/ML
10 INJECTION INTRAMUSCULAR; INTRAVENOUS ONCE AS NEEDED
Status: COMPLETED | OUTPATIENT
Start: 2024-09-01 | End: 2024-09-01

## 2024-09-01 RX ORDER — ONDANSETRON HYDROCHLORIDE 2 MG/ML
INJECTION, SOLUTION INTRAVENOUS AS NEEDED
Status: DISCONTINUED | OUTPATIENT
Start: 2024-09-01 | End: 2024-09-01

## 2024-09-01 RX ORDER — MIDAZOLAM HYDROCHLORIDE 1 MG/ML
0.5 INJECTION INTRAMUSCULAR; INTRAVENOUS ONCE
Status: COMPLETED | OUTPATIENT
Start: 2024-09-01 | End: 2024-09-01

## 2024-09-01 RX ORDER — LIDOCAINE HYDROCHLORIDE 10 MG/ML
10 INJECTION, SOLUTION INFILTRATION; PERINEURAL ONCE
Status: COMPLETED | OUTPATIENT
Start: 2024-09-01 | End: 2024-09-01

## 2024-09-01 RX ORDER — HYDROMORPHONE HYDROCHLORIDE 1 MG/ML
0.5 INJECTION, SOLUTION INTRAMUSCULAR; INTRAVENOUS; SUBCUTANEOUS EVERY 5 MIN PRN
Status: DISCONTINUED | OUTPATIENT
Start: 2024-09-01 | End: 2024-09-01 | Stop reason: HOSPADM

## 2024-09-01 RX ORDER — PHENYLEPHRINE HCL IN 0.9% NACL 0.4MG/10ML
SYRINGE (ML) INTRAVENOUS AS NEEDED
Status: DISCONTINUED | OUTPATIENT
Start: 2024-09-01 | End: 2024-09-01

## 2024-09-01 RX ORDER — OXYCODONE HYDROCHLORIDE 5 MG/1
5 TABLET ORAL EVERY 4 HOURS PRN
Status: DISCONTINUED | OUTPATIENT
Start: 2024-09-01 | End: 2024-09-04

## 2024-09-01 RX ADMIN — PROPOFOL 150 MG: 10 INJECTION, EMULSION INTRAVENOUS at 14:45

## 2024-09-01 RX ADMIN — OXYCODONE HYDROCHLORIDE 10 MG: 5 TABLET ORAL at 18:04

## 2024-09-01 RX ADMIN — MIDAZOLAM HYDROCHLORIDE 1 MG: 1 INJECTION, SOLUTION INTRAMUSCULAR; INTRAVENOUS at 09:02

## 2024-09-01 RX ADMIN — ROCURONIUM BROMIDE 60 MG: 10 INJECTION INTRAVENOUS at 14:45

## 2024-09-01 RX ADMIN — MIDAZOLAM HYDROCHLORIDE 1 MG: 1 INJECTION, SOLUTION INTRAMUSCULAR; INTRAVENOUS at 08:58

## 2024-09-01 RX ADMIN — Medication 40 MCG: at 16:33

## 2024-09-01 RX ADMIN — ATROPINE SULFATE 0.5 MG: 0.1 INJECTION INTRAVENOUS at 17:04

## 2024-09-01 RX ADMIN — FENTANYL CITRATE 100 MCG: 50 INJECTION, SOLUTION INTRAMUSCULAR; INTRAVENOUS at 14:45

## 2024-09-01 RX ADMIN — FENTANYL CITRATE 50 MCG: 50 INJECTION, SOLUTION INTRAMUSCULAR; INTRAVENOUS at 09:03

## 2024-09-01 RX ADMIN — TRANEXAMIC ACID 500 MG: 10 INJECTION, SOLUTION INTRAVENOUS at 15:04

## 2024-09-01 RX ADMIN — LIDOCAINE HYDROCHLORIDE 10 ML: 10 INJECTION, SOLUTION INFILTRATION; PERINEURAL at 08:38

## 2024-09-01 RX ADMIN — HYDROMORPHONE HYDROCHLORIDE 0.4 MG: 1 INJECTION, SOLUTION INTRAMUSCULAR; INTRAVENOUS; SUBCUTANEOUS at 12:37

## 2024-09-01 RX ADMIN — Medication 40 MCG: at 15:34

## 2024-09-01 RX ADMIN — SODIUM CHLORIDE, SODIUM LACTATE, POTASSIUM CHLORIDE, AND CALCIUM CHLORIDE: 600; 310; 30; 20 INJECTION, SOLUTION INTRAVENOUS at 16:01

## 2024-09-01 RX ADMIN — ONDANSETRON 4 MG: 2 INJECTION INTRAMUSCULAR; INTRAVENOUS at 01:37

## 2024-09-01 RX ADMIN — ENOXAPARIN SODIUM 30 MG: 100 INJECTION SUBCUTANEOUS at 20:21

## 2024-09-01 RX ADMIN — OXYCODONE HYDROCHLORIDE 10 MG: 5 TABLET ORAL at 23:05

## 2024-09-01 RX ADMIN — SENNOSIDES AND DOCUSATE SODIUM 2 TABLET: 50; 8.6 TABLET ORAL at 20:21

## 2024-09-01 RX ADMIN — NEOSTIGMINE METHYLSULFATE 2.5 MG: 1 INJECTION, SOLUTION INTRAVENOUS at 17:04

## 2024-09-01 RX ADMIN — SODIUM CHLORIDE, POTASSIUM CHLORIDE, SODIUM LACTATE AND CALCIUM CHLORIDE 75 ML/HR: 600; 310; 30; 20 INJECTION, SOLUTION INTRAVENOUS at 07:35

## 2024-09-01 RX ADMIN — HYDROMORPHONE HYDROCHLORIDE 0.5 MG: 1 INJECTION, SOLUTION INTRAMUSCULAR; INTRAVENOUS; SUBCUTANEOUS at 17:02

## 2024-09-01 RX ADMIN — Medication 2 L/MIN: at 09:14

## 2024-09-01 RX ADMIN — ROCURONIUM BROMIDE 5 MG: 10 INJECTION INTRAVENOUS at 15:30

## 2024-09-01 RX ADMIN — ONDANSETRON 4 MG: 2 INJECTION INTRAMUSCULAR; INTRAVENOUS at 06:08

## 2024-09-01 RX ADMIN — METOCLOPRAMIDE 10 MG: 5 INJECTION, SOLUTION INTRAMUSCULAR; INTRAVENOUS at 17:28

## 2024-09-01 RX ADMIN — LIDOCAINE HYDROCHLORIDE 40 MG: 20 INJECTION, SOLUTION INFILTRATION; PERINEURAL at 14:45

## 2024-09-01 RX ADMIN — ACETAMINOPHEN 975 MG: 325 TABLET ORAL at 19:04

## 2024-09-01 RX ADMIN — Medication 40 MCG: at 15:07

## 2024-09-01 RX ADMIN — HYDROMORPHONE HYDROCHLORIDE 0.4 MG: 1 INJECTION, SOLUTION INTRAMUSCULAR; INTRAVENOUS; SUBCUTANEOUS at 21:56

## 2024-09-01 RX ADMIN — HYDROMORPHONE HYDROCHLORIDE 0.5 MG: 1 INJECTION, SOLUTION INTRAMUSCULAR; INTRAVENOUS; SUBCUTANEOUS at 17:31

## 2024-09-01 RX ADMIN — CEFAZOLIN 2 G: 1 INJECTION, POWDER, FOR SOLUTION INTRAMUSCULAR; INTRAVENOUS at 15:00

## 2024-09-01 RX ADMIN — SODIUM CHLORIDE, SODIUM LACTATE, POTASSIUM CHLORIDE, AND CALCIUM CHLORIDE: 600; 310; 30; 20 INJECTION, SOLUTION INTRAVENOUS at 14:42

## 2024-09-01 RX ADMIN — HYDROMORPHONE HYDROCHLORIDE 0.5 MG: 1 INJECTION, SOLUTION INTRAMUSCULAR; INTRAVENOUS; SUBCUTANEOUS at 01:37

## 2024-09-01 RX ADMIN — FENTANYL CITRATE 49 MCG: 50 INJECTION, SOLUTION INTRAMUSCULAR; INTRAVENOUS at 09:02

## 2024-09-01 RX ADMIN — DEXAMETHASONE SODIUM PHOSPHATE 4 MG: 4 INJECTION INTRA-ARTICULAR; INTRALESIONAL; INTRAMUSCULAR; INTRAVENOUS; SOFT TISSUE at 15:44

## 2024-09-01 RX ADMIN — Medication 100 MCG: at 16:38

## 2024-09-01 RX ADMIN — Medication 2 L/MIN: at 17:18

## 2024-09-01 RX ADMIN — HYDROMORPHONE HYDROCHLORIDE 1 MG: 1 INJECTION, SOLUTION INTRAMUSCULAR; INTRAVENOUS; SUBCUTANEOUS at 08:04

## 2024-09-01 RX ADMIN — PROPOFOL 30 MG: 10 INJECTION, EMULSION INTRAVENOUS at 17:14

## 2024-09-01 RX ADMIN — PROPOFOL 20 MG: 10 INJECTION, EMULSION INTRAVENOUS at 17:19

## 2024-09-01 RX ADMIN — ONDANSETRON 4 MG: 2 INJECTION INTRAMUSCULAR; INTRAVENOUS at 17:02

## 2024-09-01 RX ADMIN — MIDAZOLAM HYDROCHLORIDE 0.5 MG: 1 INJECTION, SOLUTION INTRAMUSCULAR; INTRAVENOUS at 08:26

## 2024-09-01 RX ADMIN — CEFAZOLIN SODIUM 2 G: 2 INJECTION, SOLUTION INTRAVENOUS at 23:08

## 2024-09-01 RX ADMIN — HYDROMORPHONE HYDROCHLORIDE 0.5 MG: 1 INJECTION, SOLUTION INTRAMUSCULAR; INTRAVENOUS; SUBCUTANEOUS at 17:46

## 2024-09-01 RX ADMIN — ONDANSETRON 4 MG: 2 INJECTION, SOLUTION INTRAMUSCULAR; INTRAVENOUS at 17:14

## 2024-09-01 RX ADMIN — HYDROMORPHONE HYDROCHLORIDE 0.5 MG: 1 INJECTION, SOLUTION INTRAMUSCULAR; INTRAVENOUS; SUBCUTANEOUS at 03:44

## 2024-09-01 RX ADMIN — HYDROMORPHONE HYDROCHLORIDE 0.5 MG: 1 INJECTION, SOLUTION INTRAMUSCULAR; INTRAVENOUS; SUBCUTANEOUS at 06:08

## 2024-09-01 RX ADMIN — OXYCODONE HYDROCHLORIDE 10 MG: 5 TABLET ORAL at 19:04

## 2024-09-01 RX ADMIN — ROCURONIUM BROMIDE 5 MG: 10 INJECTION INTRAVENOUS at 16:00

## 2024-09-01 SDOH — SOCIAL STABILITY: SOCIAL INSECURITY: HAVE YOU HAD ANY THOUGHTS OF HARMING ANYONE ELSE?: NO

## 2024-09-01 SDOH — HEALTH STABILITY: PHYSICAL HEALTH: ON AVERAGE, HOW MANY MINUTES DO YOU ENGAGE IN EXERCISE AT THIS LEVEL?: 30 MIN

## 2024-09-01 SDOH — HEALTH STABILITY: MENTAL HEALTH: CURRENT SMOKER: 1

## 2024-09-01 SDOH — SOCIAL STABILITY: SOCIAL INSECURITY: ABUSE: ADULT

## 2024-09-01 SDOH — SOCIAL STABILITY: SOCIAL INSECURITY: HAVE YOU HAD THOUGHTS OF HARMING ANYONE ELSE?: NO

## 2024-09-01 SDOH — SOCIAL STABILITY: SOCIAL INSECURITY: DOES ANYONE TRY TO KEEP YOU FROM HAVING/CONTACTING OTHER FRIENDS OR DOING THINGS OUTSIDE YOUR HOME?: NO

## 2024-09-01 SDOH — SOCIAL STABILITY: SOCIAL INSECURITY: DO YOU FEEL ANYONE HAS EXPLOITED OR TAKEN ADVANTAGE OF YOU FINANCIALLY OR OF YOUR PERSONAL PROPERTY?: NO

## 2024-09-01 SDOH — SOCIAL STABILITY: SOCIAL INSECURITY
ASK PARENT OR GUARDIAN: ARE THERE TIMES WHEN YOU, YOUR CHILD(REN), OR ANY MEMBER OF YOUR HOUSEHOLD FEEL UNSAFE, HARMED, OR THREATENED AROUND PERSONS WITH WHOM YOU KNOW OR LIVE?: NO

## 2024-09-01 SDOH — HEALTH STABILITY: PHYSICAL HEALTH: ON AVERAGE, HOW MANY DAYS PER WEEK DO YOU ENGAGE IN MODERATE TO STRENUOUS EXERCISE (LIKE A BRISK WALK)?: 2 DAYS

## 2024-09-01 SDOH — SOCIAL STABILITY: SOCIAL INSECURITY: ARE THERE ANY APPARENT SIGNS OF INJURIES/BEHAVIORS THAT COULD BE RELATED TO ABUSE/NEGLECT?: NO

## 2024-09-01 SDOH — SOCIAL STABILITY: SOCIAL INSECURITY: WERE YOU ABLE TO COMPLETE ALL THE BEHAVIORAL HEALTH SCREENINGS?: YES

## 2024-09-01 SDOH — SOCIAL STABILITY: SOCIAL INSECURITY: DO YOU FEEL UNSAFE GOING BACK TO THE PLACE WHERE YOU ARE LIVING?: NO

## 2024-09-01 SDOH — SOCIAL STABILITY: SOCIAL INSECURITY: HAS ANYONE EVER THREATENED TO HURT YOUR FAMILY OR YOUR PETS?: NO

## 2024-09-01 SDOH — SOCIAL STABILITY: SOCIAL INSECURITY: ARE YOU OR HAVE YOU BEEN THREATENED OR ABUSED PHYSICALLY, EMOTIONALLY, OR SEXUALLY BY ANYONE?: NO

## 2024-09-01 ASSESSMENT — ENCOUNTER SYMPTOMS
WHEEZING: 0
VOMITING: 0
COUGH: 0
BRUISES/BLEEDS EASILY: 0
WEAKNESS: 0
ABDOMINAL PAIN: 0
CHILLS: 0
NAUSEA: 0
NECK PAIN: 0
FEVER: 0
ARTHRALGIAS: 1
FLANK PAIN: 1
SHORTNESS OF BREATH: 0
SINUS PAIN: 0
BACK PAIN: 1
PALPITATIONS: 0
HEADACHES: 0

## 2024-09-01 ASSESSMENT — COLUMBIA-SUICIDE SEVERITY RATING SCALE - C-SSRS
2. HAVE YOU ACTUALLY HAD ANY THOUGHTS OF KILLING YOURSELF?: NO
2. HAVE YOU ACTUALLY HAD ANY THOUGHTS OF KILLING YOURSELF?: NO
6. HAVE YOU EVER DONE ANYTHING, STARTED TO DO ANYTHING, OR PREPARED TO DO ANYTHING TO END YOUR LIFE?: NO
6. HAVE YOU EVER DONE ANYTHING, STARTED TO DO ANYTHING, OR PREPARED TO DO ANYTHING TO END YOUR LIFE?: NO
1. IN THE PAST MONTH, HAVE YOU WISHED YOU WERE DEAD OR WISHED YOU COULD GO TO SLEEP AND NOT WAKE UP?: NO
1. IN THE PAST MONTH, HAVE YOU WISHED YOU WERE DEAD OR WISHED YOU COULD GO TO SLEEP AND NOT WAKE UP?: NO
6. HAVE YOU EVER DONE ANYTHING, STARTED TO DO ANYTHING, OR PREPARED TO DO ANYTHING TO END YOUR LIFE?: NO
6. HAVE YOU EVER DONE ANYTHING, STARTED TO DO ANYTHING, OR PREPARED TO DO ANYTHING TO END YOUR LIFE?: NO

## 2024-09-01 ASSESSMENT — COGNITIVE AND FUNCTIONAL STATUS - GENERAL
DAILY ACTIVITIY SCORE: 18
TURNING FROM BACK TO SIDE WHILE IN FLAT BAD: A LOT
HELP NEEDED FOR BATHING: A LITTLE
WALKING IN HOSPITAL ROOM: TOTAL
TOILETING: A LITTLE
DRESSING REGULAR UPPER BODY CLOTHING: A LITTLE
STANDING UP FROM CHAIR USING ARMS: TOTAL
MOVING FROM LYING ON BACK TO SITTING ON SIDE OF FLAT BED WITH BEDRAILS: A LOT
MOBILITY SCORE: 9
PATIENT BASELINE BEDBOUND: NO
CLIMB 3 TO 5 STEPS WITH RAILING: TOTAL
DRESSING REGULAR LOWER BODY CLOTHING: A LOT
MOVING TO AND FROM BED TO CHAIR: A LOT
PERSONAL GROOMING: A LITTLE

## 2024-09-01 ASSESSMENT — ACTIVITIES OF DAILY LIVING (ADL)
HEARING - RIGHT EAR: FUNCTIONAL
TOILETING: INDEPENDENT
BATHING: INDEPENDENT
LACK_OF_TRANSPORTATION: NO
HEARING - LEFT EAR: FUNCTIONAL
GROOMING: INDEPENDENT
DRESSING YOURSELF: INDEPENDENT
WALKS IN HOME: INDEPENDENT
PATIENT'S MEMORY ADEQUATE TO SAFELY COMPLETE DAILY ACTIVITIES?: YES
ADEQUATE_TO_COMPLETE_ADL: YES
JUDGMENT_ADEQUATE_SAFELY_COMPLETE_DAILY_ACTIVITIES: YES
FEEDING YOURSELF: INDEPENDENT

## 2024-09-01 ASSESSMENT — PATIENT HEALTH QUESTIONNAIRE - PHQ9
1. LITTLE INTEREST OR PLEASURE IN DOING THINGS: NOT AT ALL
SUM OF ALL RESPONSES TO PHQ9 QUESTIONS 1 & 2: 0
2. FEELING DOWN, DEPRESSED OR HOPELESS: NOT AT ALL

## 2024-09-01 ASSESSMENT — PAIN SCALES - GENERAL
PAINLEVEL_OUTOF10: 0 - NO PAIN
PAINLEVEL_OUTOF10: 7
PAINLEVEL_OUTOF10: 10 - WORST POSSIBLE PAIN
PAINLEVEL_OUTOF10: 7
PAINLEVEL_OUTOF10: 9
PAINLEVEL_OUTOF10: 6
PAINLEVEL_OUTOF10: 7
PAINLEVEL_OUTOF10: 7
PAINLEVEL_OUTOF10: 8
PAIN_LEVEL: 1
PAINLEVEL_OUTOF10: 10 - WORST POSSIBLE PAIN
PAINLEVEL_OUTOF10: 7
PAINLEVEL_OUTOF10: 10 - WORST POSSIBLE PAIN
PAINLEVEL_OUTOF10: 9

## 2024-09-01 ASSESSMENT — PAIN SCALES - PAIN ASSESSMENT IN ADVANCED DEMENTIA (PAINAD): TOTALSCORE: MEDICATION (SEE MAR);HEAT APPLIED

## 2024-09-01 ASSESSMENT — LIFESTYLE VARIABLES
SUBSTANCE_ABUSE_PAST_12_MONTHS: YES
SKIP TO QUESTIONS 9-10: 0
HOW MANY STANDARD DRINKS CONTAINING ALCOHOL DO YOU HAVE ON A TYPICAL DAY: 1 OR 2
HOW OFTEN DO YOU HAVE 6 OR MORE DRINKS ON ONE OCCASION: LESS THAN MONTHLY
AUDIT-C TOTAL SCORE: 2
HOW OFTEN DO YOU HAVE A DRINK CONTAINING ALCOHOL: MONTHLY OR LESS
AUDIT-C TOTAL SCORE: 2
PRESCIPTION_ABUSE_PAST_12_MONTHS: NO

## 2024-09-01 ASSESSMENT — PAIN - FUNCTIONAL ASSESSMENT
PAIN_FUNCTIONAL_ASSESSMENT: 0-10

## 2024-09-01 ASSESSMENT — PAIN DESCRIPTION - DESCRIPTORS
DESCRIPTORS: ACHING
DESCRIPTORS: ACHING
DESCRIPTORS: ACHING;BURNING
DESCRIPTORS: ACHING;BURNING

## 2024-09-01 ASSESSMENT — PAIN DESCRIPTION - LOCATION: LOCATION: PELVIS

## 2024-09-01 NOTE — ED PROCEDURE NOTE
Procedure  Moderate Sedation    Performed by: Betsy Be DO  Authorized by: Juan José Salinas MD    Consent:     Consent obtained:  Written    Consent given by:  Patient    Risks, benefits, and alternatives were discussed: yes      Risks discussed:  Inadequate sedation, respiratory compromise necessitating ventilatory assistance and intubation, prolonged sedation necessitating reversal, nausea and vomiting    Alternatives discussed:  Analgesia without sedation and anxiolysis  Universal protocol:     Procedure explained and questions answered to patient or proxy's satisfaction: yes      Test results available: yes      Imaging studies available: yes      Immediately prior to procedure, a time out was called: yes      Patient identity confirmed:  Verbally with patient and arm band  Indications:     Procedure performed:  Fracture reduction    Procedure necessitating sedation performed by:  Different physician    Intended level of sedation:  Moderate  Pre-sedation assessment:     Time since last food or drink:  9 hours    ASA classification: class 1 - normal, healthy patient      Mouth opening:  3 or more finger widths    Mallampati score:  I - soft palate, uvula, fauces, pillars visible    Neck mobility: normal      Pre-sedation assessments completed and reviewed: airway patency, mental status, pain level and respiratory function      History of difficult intubation: no    Immediate pre-procedure details:     Reassessment: Patient reassessed immediately prior to procedure      Reviewed: vital signs      Verified: bag valve mask available, intubation equipment available, IV patency confirmed, oxygen available and suction available    Procedure details (see MAR for exact dosages):     Sedation start time:  9/1/2024 8:57 AM    Preoxygenation:  Nasal cannula    Sedation:  Midazolam    Analgesia:  Fentanyl    Intra-procedure monitoring:  Blood pressure monitoring, cardiac monitor, continuous capnometry, continuous pulse  oximetry and frequent vital sign checks    Intra-procedure events: none      Sedation end time:  9/1/2024 9:05 AM    Total sedation time (minutes):  8  Post-procedure details:     Attendance: Constant attendance by certified staff until patient recovered      Recovery: Patient returned to pre-procedure baseline      Estimated blood loss (see I/O flowsheets): no      Specimens recovered:  None    Patient is stable for discharge or admission: yes      Procedure completion:  Tolerated well, no immediate complications               Betsy Be DO  Resident  09/01/24 0931
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
## 2024-09-01 NOTE — ANESTHESIA POSTPROCEDURE EVALUATION
Patient: Rosa Saba    Procedure Summary       Date: 09/01/24 Room / Location: [facility] OR 01 / Virtual [facility]    Anesthesia Start: 1430 Anesthesia Stop: 1723    Procedure: Open Reduction Internal Fixation Pelvis (Bilateral: Pelvis) Diagnosis:       Pelvic ring fracture, closed, initial encounter (Multi)      (Pelvic ring fracture, closed, initial encounter (Multi) [S32.810A])    Surgeons: Rancho Juarez MD Responsible Provider: Yazmin Kennedy MD    Anesthesia Type: general ASA Status: 2            Anesthesia Type: general    Vitals Value Taken Time   /83 09/01/24 1719   Temp 36 °C (96.8 °F) 09/01/24 1718   Pulse 90 09/01/24 1722   Resp 9 09/01/24 1722   SpO2 100 % 09/01/24 1722   Vitals shown include unfiled device data.    Anesthesia Post Evaluation    Patient location during evaluation: PACU  Patient participation: complete - patient participated  Level of consciousness: sleepy but conscious  Pain score: 1  Pain management: adequate  Airway patency: patent  Cardiovascular status: acceptable  Respiratory status: acceptable  Hydration status: acceptable  Postoperative Nausea and Vomiting: none  Comments: Talked to OB team [id number]  ,they  will come and see her in the floor .      No notable events documented.
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
## 2024-09-01 NOTE — ANESTHESIA PROCEDURE NOTES
Airway  Date/Time: 9/1/2024 2:48 PM  Urgency: elective    Airway not difficult    Staffing  Performed: NASIR   Authorized by: NASIR Chandler    Performed by: NASIR Chandler  Patient location during procedure: OR    Indications and Patient Condition  Indications for airway management: anesthesia  Spontaneous Ventilation: absent  Sedation level: deep  Preoxygenated: yes  Patient position: sniffing  Mask difficulty assessment: 1 - vent by mask    Final Airway Details  Final airway type: endotracheal airway      Successful airway: ETT  Cuffed: yes   Successful intubation technique: direct laryngoscopy  Endotracheal tube insertion site: oral  Blade: Zabrina  Blade size: #3  ETT size (mm): 7.0  Cormack-Lehane Classification: grade I - full view of glottis  Placement verified by: chest auscultation and capnometry   Measured from: lips  ETT to lips (cm): 21
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
## 2024-09-01 NOTE — OP NOTE
Open Reduction Internal Fixation Pelvis (B) Operative Note     Date: 2024  OR Location: [facility] OR    Name: Rosa Saba, : 2000, Age: 24 y.o., MRN: [id number], Sex: female    Diagnosis  Pre-op Diagnosis      * Pelvic ring fracture, closed, initial encounter (Multi) [S32.810A] Post-op Diagnosis     * Pelvic ring fracture, closed, initial encounter (Multi) [S32.810A]     Procedures  1.)  Percutaneous posterior pelvic ring fixation (CPT 50178)  2.) Anterior pelvic external fixator (CPT 79317)  3.)  Removal of traction pin right distal femur (CPT 95122)    Surgeons      * Rancho Juarez - Primary    Resident/Fellow/Other Assistant:  Surgeons and Role:     * Abner Yarbrough, DO - Resident - Assisting     * Rufino Gutierrez, DO - Resident - Assisting     * Jose R Talavera MD - Fellow    Procedure Summary  Anesthesia: General  ASA: II  Anesthesia Staff: Anesthesiologist: Yazmin Kennedy MD  C-AA: NASIR Chandler; NASIR Merchant  Estimated Blood Loss: 25 mL  Intra-op Medications:   Administrations occurring from 1355 to 1730 on 24:   Medication Name Total Dose   sodium chloride 0.9 % irrigation solution 1,000 mL              Anesthesia Record               Intraprocedure I/O Totals          Intake    LR bolus 1000.00 mL    Tranexamic Acid 0.00 mL    The total shown is the total volume documented since Anesthesia Start was filed.    Total Intake 1000 mL          Specimen: No specimens collected     Staff:   Circulator: Rebecca  Scrub Person: Flora         Drains and/or Catheters:   Urethral Catheter Latex 16 Fr. (Active)       Tourniquet Times:         Implants:  Implants       Type Name Action Serial No.      Screw SCREW, CANNULATED, ASNIS III, 6.5MM X 95MM, STERILE - [id number] Implanted      Screw SCREW,CANNUL, SS ASNIS III, 6.5 X 90, 40 THR, STRL - [id number] Implanted      Screw SCREW CORTICAL SELFTAP 4.5X50 - [id number] Implanted      Screw WASHER, f/6.5/8.0 SCREW SS -  [id number] Implanted      Screw COUPLING, OLIVIA TO OLIVIA - [id number] Implanted               Findings: As above    Indications: Rosa Saba is an 24 y.o. female who is having surgery for Pelvic ring fracture, closed, initial encounter (Multi) [S32.810A].     The patient was seen in the preoperative area. The risks, benefits, complications, treatment options, non-operative alternatives, expected recovery and outcomes were discussed with the patient. The possibilities of reaction to medication, pulmonary aspiration, injury to surrounding structures, bleeding, recurrent infection, the need for additional procedures, failure to diagnose a condition, and creating a complication requiring transfusion or operation were discussed with the patient. The patient concurred with the proposed plan, giving informed consent.  The site of surgery was properly noted/marked if necessary per policy. The patient has been actively warmed in preoperative area. Preoperative antibiotics have been ordered and given within 1 hours of incision. Venous thrombosis prophylaxis have been ordered including unilateral sequential compression device and chemical prophylaxis    Preoperative diagnosis: Right unstable sacral fracture, bilateral anterior superior and inferior ramus fractures      Postoperative diagnosis: Same     Procedure:   1.)  Percutaneous posterior pelvic ring fixation (CPT 91999)  2.) Anterior pelvic external fixator (CPT 39243)   3.)  Removal of traction pin right distal femur (CPT 73867)    Procedure  With the patient's verbal permission, the left knee was prepped in standard sterile fashion with betadine and alcohol. The skin of the knee was then anesthetized with ethyl cholride spray and then injected with a prefilled syringe of 40mg kenalog + 5 ml 1% lidocaine using the lateral inferior approach without difficulty. The patient tolerated this well and felt immediate relief. A band-aid was applied and the patient ambulated out of  clinic on their own accord without difficulty.     PLAN:  He will continue to monitor his blood sugars closely over the next 24-48 hours for any elevations. He will continue with his home therapy program, continue with activity as tolerated. He will follow up 3 months or sooner if he desired injection for the right knee.       Date of Procedure: 9/1/2024     Attending Surgeon: Rancho Juarez MD     Assistants: Jose R Talavera MD; Abner Yarbrough DO    Anesthesia: General     Estimated blood loss: 25 cc     Intraoperative findings: As above     Components used: Hope 6.5 mm cannulated screws, Hope Phelan III Ex-Fix      Indications:     We reviewed the informed consent process and form in the hospital and both signed.  The surgical site was signed and I performed a timeout in the operating room. The patient received prophylactic antibiotics as well as TXA prior to skin incision.  Please note that Dr. Talavera was a critical portion of the case due to the complex nature and lack of experienced resident or surgical assist to execute.     Details of procedure:  Patient was taken to the operating room and placed on the operating table in supine position.  At this point general anesthesia was administered.  After induction the anesthesia team to control the patient cervical spine as well as airway.  All bony prominences were properly identified well-padded.  The pelvis as well as right lower extremity including traction pin was then prepped and draped in sterile orthopedic fashion. Once drapes were in place a timeout procedure was performed confirming appropriate patient identity, surgical site as well as procedure to be performed.  Once all in the room were in agreement we would proceed with the case.    We started by hanging traction off the distal end of the bed by the inserted traction pin.  This did improve the posterior pelvic ring disruption however it still needed to be adjusted slightly.  For this reason  we were able to identify the AIIS corridor on the right side and make an incision over top of this.  We dissected down to the level of the bone and then inserted a external fixator pin into the site utilizing iliac oblique as well as a rollover inlet view.  We are then able to confirm that it was superbly sank down we made sure that the orientation was such that she could still flex her hips.  We then were able to utilize this as a joystick as we then pulled the pelvis forward as well as perform some external rotation to adequately reduce the posterior pelvis.  Once we saw that we could obtain this reduction we then were able to identify an appropriate starting point utilizing a lateral view of the sacrum.  Skin was incised with a 10 blade followed electrocautery to obtain hemostasis.  We are able to insert our wire down to the outer table of the pelvis and utilizing a combination of an inlet as well as outlet view we were able to identify an appropriate starting point for a first sacral segment SI screw and then gradually advanced utilizing both of these views.  We made sure that was perpendicular fracture line and then were able to insert to the appropriate depth.  This was measured and then we overdrilled over top the wire to the level of fracture.  A partially-threaded screw with a washer was then inserted over top of the wire to compress our fracture nicely.  We did make sure that while the wire as well as screw were being passed we made sure to hold the reduction with our previously inserted pin as well as traction off the end of the table as mentioned above.  Once we are satisfied with this reduction we are able to remove the wire.  We were then able to come down to the second sacral segment.  Again we are able to identify an appropriate starting position on the outer table the pelvis with a starting wire.  This was then gradually inserted utilizing an inlet as well as outlet view so that he could safely  traverse the second sacral segment.  It was inserted to the outer table of the contralateral left side and then punched through the cortex.  We are then able to overdrilled over top the entire wire after measuring an appropriate length screw.  The screw was then inserted over top of our wire and confirmed to be down utilizing a rollover inlet view.  Final inlet and outlet views of the sacrum confirmed excellent overall reduction as well as safe position of all orthopedic hardware.  Because of the significant instability we decided we needed to fix the anterior pelvis as well given the bilateral ramus fractures as well as significant unstable posterior sacral ring fracture.  For that reason we are able to utilize the outlet obturator oblique view to identify the AIIS corridor on the contralateral left side and then make an skin incision.  Similarly the right side we are then able to dissect down to the AIIS and insert our pin down the bone.  Utilizing a roll over inlet view as well as iliac oblique were then able to identify the appropriate starting point as well as trajectory of our half pain which was inserted down to the level of the sciatic notch keeping careful attention that it had enough room for clearance for her to perform hip flexion.  Bars and clamps were then inserted and we were able to utilize an external rotation moment to then reduce the anterior pelvis and all bars and clamps were then tightened and confirmed to be final tightened.  Final fluoroscopic views were obtained including a lateral view of the sacrum.  All orthopedic hardware was in good appropriate position on her pelvic ring was overall well reduced.  Wounds were thoroughly irrigated with sterile saline.  2-0 Vicryl suture followed by staples were used for closure.  Guy dressing was applied.  Patient was awakened by anesthesia staff without complication overall tolerated procedure well.  She is taken to PACU in stable condition.      Post Operative Plan: Patient will be flatfoot weightbearing on the right lower extremity but weightbearing as tolerated on the left lower extremity.  Should be discharged appropriate pain medication as well as DVT prophylaxis per the primary service.  Follow-up should be with me in 2 to 4 weeks with AP, inlet/outlet films as well as for wound check.     Note dictated with b3 bio  transcription software. Completed without full typed error editing and sent to avoid delay.    Attending Attestation: I was present and scrubbed for the entire procedure.    Rancho Juarez  Phone Number: [phone]

## 2024-09-01 NOTE — ANESTHESIA PROCEDURE NOTES
Arterial Line:    Date/Time: 9/1/2024 3:00 PM    Staffing  Performed: CAA   Authorized by: Yazmin Kennedy MD    Performed by: NASIR Merchant    An arterial line was placed. Procedure performed using surface landmarks.in the OR for the following indication(s): continuous blood pressure monitoring and blood sampling needed.    A 20 gauge (size), 1 and 3/4 inch (length), Angiocath (type) catheter was placed into the Left radial artery, secured by Tegaderm,   Seldinger technique not used.  Events:  patient tolerated procedure well with no complications.

## 2024-09-01 NOTE — SIGNIFICANT EVENT
RCRI 0, Class I Risk    Patient clear for OR with ortho from a trauma perspective.    Discussed with Dr. Dell Uribe, APRN-CNP  Trauma, Critical Care, and Acute Care Surgery  Ext [phone] (floor), [phone] (ICU)

## 2024-09-01 NOTE — SIGNIFICANT EVENT
OR clearance      Called by anesthesia team while pt in pre-op, discussed concern from anesthesia team about pt being approximately 5 weeks pregnant and going to surgery for pelvis fracture repair.     Asked patient at bedside for permission to discuss medical care with mother and father present at bedside, patient gave verbal permission to proceed with conversation with parents present. Discussed with patient specifics to risk of pelvic surgery while 5 weeks pregnant. Informed her of radiation exposure during case due to multiple pelvic xrays, which can result in spontaneous  or birth defects. Patient elected to proceed with surgery understanding risks.     Dr. Juarez, orthopedics attending, at bedside while discussing risks vs benefits of surgery    Pt discussed with trauma attending, Dr. Shellie Hernandez PA-C  Trauma, Critical Care, Acute Care Surgery   Floor: [phone]  TSICU: [id number]

## 2024-09-01 NOTE — CARE PLAN
Problem: Fall/Injury  Goal: Not fall by end of shift  Outcome: Progressing  Goal: Be free from injury by end of the shift  Outcome: Progressing  Goal: Verbalize understanding of personal risk factors for fall in the hospital  Outcome: Progressing  Goal: Verbalize understanding of risk factor reduction measures to prevent injury from fall in the home  Outcome: Progressing  Goal: Use assistive devices by end of the shift  Outcome: Progressing  Goal: Pace activities to prevent fatigue by end of the shift  Outcome: Progressing     Problem: Pain  Goal: Takes deep breaths with improved pain control throughout the shift  Outcome: Progressing  Goal: Turns in bed with improved pain control throughout the shift  Outcome: Progressing  Goal: Walks with improved pain control throughout the shift  Outcome: Progressing  Goal: Performs ADL's with improved pain control throughout shift  Outcome: Progressing  Goal: Participates in PT with improved pain control throughout the shift  Outcome: Progressing  Goal: Free from opioid side effects throughout the shift  Outcome: Progressing  Goal: Free from acute confusion related to pain meds throughout the shift  Outcome: Progressing   The patient's goals for the shift include      The clinical goals for the shift include safety and free from falls
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
## 2024-09-01 NOTE — CARE PLAN
The patient's goals for the shift include      The clinical goals for the shift include safety and free from falls      Problem: Fall/Injury  Goal: Not fall by end of shift  Outcome: Progressing  Goal: Be free from injury by end of the shift  Outcome: Progressing  Goal: Verbalize understanding of personal risk factors for fall in the hospital  Outcome: Progressing  Goal: Verbalize understanding of risk factor reduction measures to prevent injury from fall in the home  Outcome: Progressing  Goal: Use assistive devices by end of the shift  Outcome: Progressing  Goal: Pace activities to prevent fatigue by end of the shift  Outcome: Progressing     Problem: Pain  Goal: Takes deep breaths with improved pain control throughout the shift  Outcome: Progressing  Goal: Turns in bed with improved pain control throughout the shift  Outcome: Progressing  Goal: Walks with improved pain control throughout the shift  Outcome: Progressing  Goal: Performs ADL's with improved pain control throughout shift  Outcome: Progressing  Goal: Participates in PT with improved pain control throughout the shift  Outcome: Progressing  Goal: Free from opioid side effects throughout the shift  Outcome: Progressing  Goal: Free from acute confusion related to pain meds throughout the shift  Outcome: Progressing
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
## 2024-09-01 NOTE — H&P
[facility] Department of Orthopaedic Surgery   Surgical History & Physical <30 Days    Reason for Surgery: R Bowen II sacral fracture, LC1 fracture  Planned Procedure: CRPP posterior pelvis, ORIF anterior pelvis    History & Physical Reviewed:  I have reviewed the History and Physical for obtained within the last 30 days. Relevant findings and updates are noted below:  No significant changes.    Home medications were reviewed with significant updates noted below:  No significant changes.    ERAS patient?: No    COVID-19 Risk Consent:   Surgeon has reviewed the key risks related to josiah COVID-19 and subsequent sequelae.     09/01/24 at 4:29 AM - Mirza Harvey MD
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
## 2024-09-02 LAB
ALBUMIN SERPL BCP-MCNC: 3.6 G/DL (ref 3.4–5)
ANION GAP SERPL CALC-SCNC: 12 MMOL/L (ref 10–20)
B-HCG SERPL-ACNC: 763 MIU/ML
BUN SERPL-MCNC: 6 MG/DL (ref 6–23)
CALCIUM SERPL-MCNC: 8.4 MG/DL (ref 8.6–10.6)
CHLORIDE SERPL-SCNC: 102 MMOL/L (ref 98–107)
CO2 SERPL-SCNC: 27 MMOL/L (ref 21–32)
CREAT SERPL-MCNC: 0.54 MG/DL (ref 0.5–1.05)
EGFRCR SERPLBLD CKD-EPI 2021: >90 ML/MIN/1.73M*2
ERYTHROCYTE [DISTWIDTH] IN BLOOD BY AUTOMATED COUNT: 12.2 % (ref 11.5–14.5)
GLUCOSE SERPL-MCNC: 99 MG/DL (ref 74–99)
HCT VFR BLD AUTO: 28.2 % (ref 36–46)
HGB BLD-MCNC: 9.6 G/DL (ref 12–16)
MAGNESIUM SERPL-MCNC: 1.6 MG/DL (ref 1.6–2.4)
MCH RBC QN AUTO: 31.1 PG (ref 26–34)
MCHC RBC AUTO-ENTMCNC: 34 G/DL (ref 32–36)
MCV RBC AUTO: 91 FL (ref 80–100)
NRBC BLD-RTO: 0 /100 WBCS (ref 0–0)
PHOSPHATE SERPL-MCNC: 2.5 MG/DL (ref 2.5–4.9)
PLATELET # BLD AUTO: 166 X10*3/UL (ref 150–450)
POTASSIUM SERPL-SCNC: 3.6 MMOL/L (ref 3.5–5.3)
RBC # BLD AUTO: 3.09 X10*6/UL (ref 4–5.2)
SODIUM SERPL-SCNC: 137 MMOL/L (ref 136–145)
WBC # BLD AUTO: 9.8 X10*3/UL (ref 4.4–11.3)

## 2024-09-02 PROCEDURE — 36415 COLL VENOUS BLD VENIPUNCTURE: CPT

## 2024-09-02 PROCEDURE — 83735 ASSAY OF MAGNESIUM: CPT | Performed by: NURSE PRACTITIONER

## 2024-09-02 PROCEDURE — 99222 1ST HOSP IP/OBS MODERATE 55: CPT | Performed by: SURGERY

## 2024-09-02 PROCEDURE — 2500000001 HC RX 250 WO HCPCS SELF ADMINISTERED DRUGS (ALT 637 FOR MEDICARE OP)

## 2024-09-02 PROCEDURE — 2500000001 HC RX 250 WO HCPCS SELF ADMINISTERED DRUGS (ALT 637 FOR MEDICARE OP): Performed by: NURSE PRACTITIONER

## 2024-09-02 PROCEDURE — 36415 COLL VENOUS BLD VENIPUNCTURE: CPT | Performed by: NURSE PRACTITIONER

## 2024-09-02 PROCEDURE — 2500000004 HC RX 250 GENERAL PHARMACY W/ HCPCS (ALT 636 FOR OP/ED)

## 2024-09-02 PROCEDURE — 2500000004 HC RX 250 GENERAL PHARMACY W/ HCPCS (ALT 636 FOR OP/ED): Performed by: NURSE PRACTITIONER

## 2024-09-02 PROCEDURE — 85027 COMPLETE CBC AUTOMATED: CPT | Performed by: NURSE PRACTITIONER

## 2024-09-02 PROCEDURE — 2500000004 HC RX 250 GENERAL PHARMACY W/ HCPCS (ALT 636 FOR OP/ED): Mod: JZ

## 2024-09-02 PROCEDURE — 97530 THERAPEUTIC ACTIVITIES: CPT | Mod: GP | Performed by: PHYSICAL THERAPIST

## 2024-09-02 PROCEDURE — 84702 CHORIONIC GONADOTROPIN TEST: CPT

## 2024-09-02 PROCEDURE — 80069 RENAL FUNCTION PANEL: CPT | Performed by: NURSE PRACTITIONER

## 2024-09-02 PROCEDURE — 97162 PT EVAL MOD COMPLEX 30 MIN: CPT | Mod: GP | Performed by: PHYSICAL THERAPIST

## 2024-09-02 PROCEDURE — 1100000001 HC PRIVATE ROOM DAILY

## 2024-09-02 RX ORDER — METOCLOPRAMIDE 10 MG/1
10 TABLET ORAL EVERY 6 HOURS PRN
Status: DISCONTINUED | OUTPATIENT
Start: 2024-09-02 | End: 2024-09-02

## 2024-09-02 RX ORDER — METOCLOPRAMIDE HYDROCHLORIDE 5 MG/ML
10 INJECTION INTRAMUSCULAR; INTRAVENOUS EVERY 6 HOURS PRN
Status: CANCELLED | OUTPATIENT
Start: 2024-09-02

## 2024-09-02 RX ORDER — METOCLOPRAMIDE HYDROCHLORIDE 5 MG/ML
10 INJECTION INTRAMUSCULAR; INTRAVENOUS EVERY 6 HOURS PRN
Status: DISCONTINUED | OUTPATIENT
Start: 2024-09-02 | End: 2024-09-02

## 2024-09-02 RX ORDER — PANTOPRAZOLE SODIUM 40 MG/1
40 TABLET, DELAYED RELEASE ORAL
Status: DISCONTINUED | OUTPATIENT
Start: 2024-09-03 | End: 2024-09-08 | Stop reason: HOSPADM

## 2024-09-02 RX ORDER — ONDANSETRON HYDROCHLORIDE 2 MG/ML
4 INJECTION, SOLUTION INTRAVENOUS EVERY 8 HOURS PRN
Status: DISCONTINUED | OUTPATIENT
Start: 2024-09-02 | End: 2024-09-08 | Stop reason: HOSPADM

## 2024-09-02 RX ORDER — POLYETHYLENE GLYCOL 3350 17 G/17G
17 POWDER, FOR SOLUTION ORAL DAILY
Status: DISCONTINUED | OUTPATIENT
Start: 2024-09-02 | End: 2024-09-06

## 2024-09-02 RX ORDER — SCOLOPAMINE TRANSDERMAL SYSTEM 1 MG/1
1 PATCH, EXTENDED RELEASE TRANSDERMAL
Status: DISCONTINUED | OUTPATIENT
Start: 2024-09-02 | End: 2024-09-08 | Stop reason: HOSPADM

## 2024-09-02 RX ORDER — SIMETHICONE 80 MG
40 TABLET,CHEWABLE ORAL 4 TIMES DAILY PRN
Status: DISCONTINUED | OUTPATIENT
Start: 2024-09-02 | End: 2024-09-06

## 2024-09-02 RX ORDER — METOCLOPRAMIDE 10 MG/1
10 TABLET ORAL EVERY 6 HOURS PRN
Status: CANCELLED | OUTPATIENT
Start: 2024-09-02

## 2024-09-02 RX ORDER — ONDANSETRON 4 MG/1
4 TABLET, FILM COATED ORAL EVERY 8 HOURS PRN
Status: DISCONTINUED | OUTPATIENT
Start: 2024-09-02 | End: 2024-09-08 | Stop reason: HOSPADM

## 2024-09-02 RX ADMIN — ACETAMINOPHEN 975 MG: 325 TABLET ORAL at 06:20

## 2024-09-02 RX ADMIN — HYDROMORPHONE HYDROCHLORIDE 0.4 MG: 1 INJECTION, SOLUTION INTRAMUSCULAR; INTRAVENOUS; SUBCUTANEOUS at 10:00

## 2024-09-02 RX ADMIN — OXYCODONE HYDROCHLORIDE 10 MG: 5 TABLET ORAL at 08:01

## 2024-09-02 RX ADMIN — SCOPOLAMINE 1 PATCH: 1.5 PATCH, EXTENDED RELEASE TRANSDERMAL at 15:22

## 2024-09-02 RX ADMIN — ENOXAPARIN SODIUM 30 MG: 100 INJECTION SUBCUTANEOUS at 08:01

## 2024-09-02 RX ADMIN — HYDROMORPHONE HYDROCHLORIDE 0.4 MG: 1 INJECTION, SOLUTION INTRAMUSCULAR; INTRAVENOUS; SUBCUTANEOUS at 16:07

## 2024-09-02 RX ADMIN — OXYCODONE HYDROCHLORIDE 10 MG: 5 TABLET ORAL at 17:50

## 2024-09-02 RX ADMIN — SENNOSIDES AND DOCUSATE SODIUM 2 TABLET: 50; 8.6 TABLET ORAL at 20:38

## 2024-09-02 RX ADMIN — HYDROMORPHONE HYDROCHLORIDE 0.4 MG: 1 INJECTION, SOLUTION INTRAMUSCULAR; INTRAVENOUS; SUBCUTANEOUS at 13:07

## 2024-09-02 RX ADMIN — METOCLOPRAMIDE 10 MG: 5 INJECTION, SOLUTION INTRAMUSCULAR; INTRAVENOUS at 12:36

## 2024-09-02 RX ADMIN — PRENATAL VIT W/ FE FUMARATE-FA TAB 27-0.8 MG 1 TABLET: 27-0.8 TAB at 15:22

## 2024-09-02 RX ADMIN — POLYETHYLENE GLYCOL 3350 17 G: 17 POWDER, FOR SOLUTION ORAL at 17:50

## 2024-09-02 RX ADMIN — SIMETHICONE 40 MG: 80 TABLET, CHEWABLE ORAL at 16:07

## 2024-09-02 RX ADMIN — ONDANSETRON 4 MG: 2 INJECTION INTRAMUSCULAR; INTRAVENOUS at 08:01

## 2024-09-02 RX ADMIN — HYDROMORPHONE HYDROCHLORIDE 0.4 MG: 1 INJECTION, SOLUTION INTRAMUSCULAR; INTRAVENOUS; SUBCUTANEOUS at 19:17

## 2024-09-02 RX ADMIN — OXYCODONE HYDROCHLORIDE 10 MG: 5 TABLET ORAL at 22:09

## 2024-09-02 RX ADMIN — CEFAZOLIN SODIUM 2 G: 2 INJECTION, SOLUTION INTRAVENOUS at 15:22

## 2024-09-02 RX ADMIN — ONDANSETRON 4 MG: 2 INJECTION INTRAMUSCULAR; INTRAVENOUS at 16:07

## 2024-09-02 RX ADMIN — HYDROMORPHONE HYDROCHLORIDE 0.4 MG: 1 INJECTION, SOLUTION INTRAMUSCULAR; INTRAVENOUS; SUBCUTANEOUS at 02:34

## 2024-09-02 RX ADMIN — ENOXAPARIN SODIUM 30 MG: 100 INJECTION SUBCUTANEOUS at 20:38

## 2024-09-02 RX ADMIN — ACETAMINOPHEN 975 MG: 325 TABLET ORAL at 00:33

## 2024-09-02 RX ADMIN — ACETAMINOPHEN 975 MG: 325 TABLET ORAL at 22:11

## 2024-09-02 RX ADMIN — ACETAMINOPHEN 975 MG: 325 TABLET ORAL at 12:30

## 2024-09-02 RX ADMIN — OXYCODONE HYDROCHLORIDE 10 MG: 5 TABLET ORAL at 03:47

## 2024-09-02 RX ADMIN — OXYCODONE HYDROCHLORIDE 10 MG: 5 TABLET ORAL at 12:29

## 2024-09-02 RX ADMIN — CEFAZOLIN SODIUM 2 G: 2 INJECTION, SOLUTION INTRAVENOUS at 06:20

## 2024-09-02 ASSESSMENT — PAIN - FUNCTIONAL ASSESSMENT
PAIN_FUNCTIONAL_ASSESSMENT: 0-10

## 2024-09-02 ASSESSMENT — COGNITIVE AND FUNCTIONAL STATUS - GENERAL
WALKING IN HOSPITAL ROOM: TOTAL
MOBILITY SCORE: 7
MOVING TO AND FROM BED TO CHAIR: TOTAL
MOVING FROM LYING ON BACK TO SITTING ON SIDE OF FLAT BED WITH BEDRAILS: A LOT
STANDING UP FROM CHAIR USING ARMS: TOTAL
TURNING FROM BACK TO SIDE WHILE IN FLAT BAD: TOTAL
CLIMB 3 TO 5 STEPS WITH RAILING: TOTAL

## 2024-09-02 ASSESSMENT — PAIN DESCRIPTION - DESCRIPTORS
DESCRIPTORS: ACHING;BURNING

## 2024-09-02 ASSESSMENT — PAIN SCALES - GENERAL
PAINLEVEL_OUTOF10: 7
PAINLEVEL_OUTOF10: 8
PAINLEVEL_OUTOF10: 7
PAINLEVEL_OUTOF10: 8
PAINLEVEL_OUTOF10: 9
PAINLEVEL_OUTOF10: 9
PAINLEVEL_OUTOF10: 7

## 2024-09-02 ASSESSMENT — ACTIVITIES OF DAILY LIVING (ADL)
ADLS_ADDRESSED: NO
ADL_ASSISTANCE: INDEPENDENT

## 2024-09-02 NOTE — PROGRESS NOTES
Occupational Therapy                 Therapy Communication Note    Patient Name: Rosa Saba  MRN: [id number]  Today's Date: 9/2/2024     Discipline: Occupational Therapy    Missed Visit Reason: Missed Visit Reason:  (9:11 pt supine in bed upon arrival, had just completed PT session. reporting 9/10 pain and politely requesting to rest. second attempt 2:26; pt sleeping soundly despite entering room and attempting to wake. will f/u as able)    Missed Time: Attempt    BHARATH LINCOLN, OT
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
## 2024-09-02 NOTE — SIGNIFICANT EVENT
José Luis consult from primary General surgery team regarding anti-emetics safe in pregnancy. Patient reported to nurse to have a reaction after Reglan was administered involving skin flushing and extreme sweating without vital sign changes. Discussed with primary team to hold Reglan given reaction and that is okay in pregnancy to receive Zofran, Scopolamine patch and Benadryl for anti-emetics and to manage reaction.     Whitley Jones MD, PGY-3

## 2024-09-02 NOTE — SIGNIFICANT EVENT
Gynecology Follow up  Gynecology team consulted due to positive pregnancy test prior to ORIF pelvis. Quant hcg 8/31 338. No IUP seen on CT pelvis. Repeat hcg 9/2 763, appropriately rising, suspected normal pregnancy. Plan to repeat hcg in 48 hours. This is an unplanned but desired pregnancy. Patient has an established OBGYN that she would like to follow with once confirmed iUP. Patient to start prenatal vitamins. All questions regarding radiation exposure in early pregnancy, and prenatal care management were discussed. Bleeding precautions were provided.     Post-operative care to be managed by primary team.     Whitley Jones MD, PGY-3
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
## 2024-09-02 NOTE — PROGRESS NOTES
Child Life Assessment:   Reason for Consult  Discipline: Child Life Specialist  Reason for Consult: Coping skill development/planning  Referral Source: Self         Patient Intervention(s)  Type of Intervention Performed: Healing environment interventions  Healing Environment Intervention(s): Assessment, Coping skill development/planning, Empathetic listening/validation of emotions, Resources provided, Orientation to services    Support Provided to Family  Support Provided to Family: Family present for patient session         Session Details: Certified Child Life Specialist (CCLS) met with patient to introduce services, assess coping and provide support. Patient's father and fiance were present at beside.  Patient and family shared frustrations related to hospitalization.  Patient shared she would like staff to ask her if it is ok to talk about her condition when other people are present in the room.   CCLS listened, validated patient's feelings and provided resources to aid in relaxation and positive coping.  Child Life will continue to follow.    Demetra Vaughn MA, SUZANNE (secure chat)

## 2024-09-02 NOTE — CARE PLAN
The patient's goals for the shift include      The clinical goals for the shift include Pt will remain safe and pain controlled during night.    Pt remained safe and free of injury during night. Pain controlled with oxycodone and dilaudid. Right ex-fix site dressing reinforced with kerlix. No other distress noted. Call light in reach. Resting quietly at this time.     Bianca Galloway RN

## 2024-09-03 PROCEDURE — 2500000005 HC RX 250 GENERAL PHARMACY W/O HCPCS

## 2024-09-03 PROCEDURE — 99231 SBSQ HOSP IP/OBS SF/LOW 25: CPT | Performed by: SURGERY

## 2024-09-03 PROCEDURE — 27216 TREAT PELVIC RING FRACTURE: CPT | Performed by: STUDENT IN AN ORGANIZED HEALTH CARE EDUCATION/TRAINING PROGRAM

## 2024-09-03 PROCEDURE — 97530 THERAPEUTIC ACTIVITIES: CPT | Mod: GO

## 2024-09-03 PROCEDURE — 97165 OT EVAL LOW COMPLEX 30 MIN: CPT | Mod: GO

## 2024-09-03 PROCEDURE — 2500000004 HC RX 250 GENERAL PHARMACY W/ HCPCS (ALT 636 FOR OP/ED): Performed by: NURSE PRACTITIONER

## 2024-09-03 PROCEDURE — 2500000001 HC RX 250 WO HCPCS SELF ADMINISTERED DRUGS (ALT 637 FOR MEDICARE OP)

## 2024-09-03 PROCEDURE — 2500000001 HC RX 250 WO HCPCS SELF ADMINISTERED DRUGS (ALT 637 FOR MEDICARE OP): Performed by: NURSE PRACTITIONER

## 2024-09-03 PROCEDURE — 2500000004 HC RX 250 GENERAL PHARMACY W/ HCPCS (ALT 636 FOR OP/ED)

## 2024-09-03 PROCEDURE — 20692 APPL MLTPLN UNI EXT FIXJ SYS: CPT | Performed by: STUDENT IN AN ORGANIZED HEALTH CARE EDUCATION/TRAINING PROGRAM

## 2024-09-03 PROCEDURE — 97530 THERAPEUTIC ACTIVITIES: CPT | Mod: GP | Performed by: PHYSICAL THERAPIST

## 2024-09-03 PROCEDURE — 1100000001 HC PRIVATE ROOM DAILY

## 2024-09-03 PROCEDURE — 97116 GAIT TRAINING THERAPY: CPT | Mod: GP | Performed by: PHYSICAL THERAPIST

## 2024-09-03 RX ADMIN — ONDANSETRON HYDROCHLORIDE 4 MG: 4 TABLET, FILM COATED ORAL at 09:42

## 2024-09-03 RX ADMIN — HYDROMORPHONE HYDROCHLORIDE 0.4 MG: 1 INJECTION, SOLUTION INTRAMUSCULAR; INTRAVENOUS; SUBCUTANEOUS at 00:01

## 2024-09-03 RX ADMIN — HYDROMORPHONE HYDROCHLORIDE 0.4 MG: 1 INJECTION, SOLUTION INTRAMUSCULAR; INTRAVENOUS; SUBCUTANEOUS at 21:40

## 2024-09-03 RX ADMIN — OXYCODONE HYDROCHLORIDE 10 MG: 5 TABLET ORAL at 06:16

## 2024-09-03 RX ADMIN — HYDROMORPHONE HYDROCHLORIDE 0.4 MG: 1 INJECTION, SOLUTION INTRAMUSCULAR; INTRAVENOUS; SUBCUTANEOUS at 11:12

## 2024-09-03 RX ADMIN — PRENATAL VIT W/ FE FUMARATE-FA TAB 27-0.8 MG 1 TABLET: 27-0.8 TAB at 07:55

## 2024-09-03 RX ADMIN — OXYCODONE HYDROCHLORIDE 10 MG: 5 TABLET ORAL at 02:05

## 2024-09-03 RX ADMIN — ENOXAPARIN SODIUM 30 MG: 100 INJECTION SUBCUTANEOUS at 07:54

## 2024-09-03 RX ADMIN — ACETAMINOPHEN 975 MG: 325 TABLET ORAL at 11:12

## 2024-09-03 RX ADMIN — HYDROMORPHONE HYDROCHLORIDE 0.4 MG: 1 INJECTION, SOLUTION INTRAMUSCULAR; INTRAVENOUS; SUBCUTANEOUS at 14:23

## 2024-09-03 RX ADMIN — OXYCODONE HYDROCHLORIDE 10 MG: 5 TABLET ORAL at 20:04

## 2024-09-03 RX ADMIN — OXYCODONE HYDROCHLORIDE 10 MG: 5 TABLET ORAL at 16:07

## 2024-09-03 RX ADMIN — ACETAMINOPHEN 975 MG: 325 TABLET ORAL at 17:48

## 2024-09-03 RX ADMIN — HYDROMORPHONE HYDROCHLORIDE 0.4 MG: 1 INJECTION, SOLUTION INTRAMUSCULAR; INTRAVENOUS; SUBCUTANEOUS at 04:04

## 2024-09-03 RX ADMIN — PANTOPRAZOLE SODIUM 40 MG: 40 TABLET, DELAYED RELEASE ORAL at 06:16

## 2024-09-03 RX ADMIN — ENOXAPARIN SODIUM 30 MG: 100 INJECTION SUBCUTANEOUS at 20:04

## 2024-09-03 RX ADMIN — POLYETHYLENE GLYCOL 3350 17 G: 17 POWDER, FOR SOLUTION ORAL at 07:55

## 2024-09-03 RX ADMIN — ACETAMINOPHEN 975 MG: 325 TABLET ORAL at 05:12

## 2024-09-03 RX ADMIN — OXYCODONE HYDROCHLORIDE 10 MG: 5 TABLET ORAL at 09:43

## 2024-09-03 RX ADMIN — HYDROMORPHONE HYDROCHLORIDE 0.4 MG: 1 INJECTION, SOLUTION INTRAMUSCULAR; INTRAVENOUS; SUBCUTANEOUS at 07:54

## 2024-09-03 RX ADMIN — SENNOSIDES AND DOCUSATE SODIUM 2 TABLET: 50; 8.6 TABLET ORAL at 20:04

## 2024-09-03 RX ADMIN — HYDROMORPHONE HYDROCHLORIDE 0.4 MG: 1 INJECTION, SOLUTION INTRAMUSCULAR; INTRAVENOUS; SUBCUTANEOUS at 17:48

## 2024-09-03 ASSESSMENT — COGNITIVE AND FUNCTIONAL STATUS - GENERAL
DRESSING REGULAR UPPER BODY CLOTHING: A LITTLE
DRESSING REGULAR LOWER BODY CLOTHING: A LOT
STANDING UP FROM CHAIR USING ARMS: A LOT
STANDING UP FROM CHAIR USING ARMS: A LOT
MOBILITY SCORE: 13
PERSONAL GROOMING: A LITTLE
MOVING FROM LYING ON BACK TO SITTING ON SIDE OF FLAT BED WITH BEDRAILS: A LITTLE
TURNING FROM BACK TO SIDE WHILE IN FLAT BAD: A LITTLE
TOILETING: A LOT
DAILY ACTIVITIY SCORE: 17
DRESSING REGULAR LOWER BODY CLOTHING: A LOT
TOILETING: A LOT
PERSONAL GROOMING: A LITTLE
STANDING UP FROM CHAIR USING ARMS: TOTAL
TURNING FROM BACK TO SIDE WHILE IN FLAT BAD: TOTAL
MOVING TO AND FROM BED TO CHAIR: A LOT
MOVING FROM LYING ON BACK TO SITTING ON SIDE OF FLAT BED WITH BEDRAILS: A LOT
MOVING TO AND FROM BED TO CHAIR: TOTAL
TOILETING: A LOT
DAILY ACTIVITIY SCORE: 16
MOVING TO AND FROM BED TO CHAIR: A LOT
CLIMB 3 TO 5 STEPS WITH RAILING: TOTAL
HELP NEEDED FOR BATHING: A LOT
WALKING IN HOSPITAL ROOM: A LOT
CLIMB 3 TO 5 STEPS WITH RAILING: TOTAL
HELP NEEDED FOR BATHING: A LOT
DAILY ACTIVITIY SCORE: 17
TURNING FROM BACK TO SIDE WHILE IN FLAT BAD: A LITTLE
WALKING IN HOSPITAL ROOM: A LOT
WALKING IN HOSPITAL ROOM: TOTAL
HELP NEEDED FOR BATHING: A LOT
CLIMB 3 TO 5 STEPS WITH RAILING: TOTAL
MOBILITY SCORE: 13
MOVING FROM LYING ON BACK TO SITTING ON SIDE OF FLAT BED WITH BEDRAILS: A LITTLE
DRESSING REGULAR LOWER BODY CLOTHING: A LOT
MOBILITY SCORE: 7
DRESSING REGULAR UPPER BODY CLOTHING: A LITTLE

## 2024-09-03 ASSESSMENT — PAIN - FUNCTIONAL ASSESSMENT
PAIN_FUNCTIONAL_ASSESSMENT: 0-10
PAIN_FUNCTIONAL_ASSESSMENT: UNABLE TO SELF-REPORT
PAIN_FUNCTIONAL_ASSESSMENT: 0-10

## 2024-09-03 ASSESSMENT — PAIN SCALES - GENERAL
PAINLEVEL_OUTOF10: 8
PAINLEVEL_OUTOF10: 7
PAINLEVEL_OUTOF10: 9
PAINLEVEL_OUTOF10: 7
PAINLEVEL_OUTOF10: 3
PAINLEVEL_OUTOF10: 10 - WORST POSSIBLE PAIN
PAINLEVEL_OUTOF10: 7
PAINLEVEL_OUTOF10: 8
PAINLEVEL_OUTOF10: 7
PAINLEVEL_OUTOF10: 7
PAINLEVEL_OUTOF10: 8
PAINLEVEL_OUTOF10: 7
PAINLEVEL_OUTOF10: 4
PAINLEVEL_OUTOF10: 8
PAINLEVEL_OUTOF10: 8
PAINLEVEL_OUTOF10: 7
PAINLEVEL_OUTOF10: 7

## 2024-09-03 ASSESSMENT — PAIN SCALES - WONG BAKER
WONGBAKER_NUMERICALRESPONSE: HURTS EVEN MORE
WONGBAKER_NUMERICALRESPONSE: HURTS EVEN MORE
WONGBAKER_NUMERICALRESPONSE: HURTS LITTLE MORE

## 2024-09-03 NOTE — PROGRESS NOTES
I spoke with Rosa regarding discharge planning and home going needs. Patient states that she lives home with her significant other [phone] where she was previously independent without assistive devices. Patient is medically cleared for discharge recommended for high intensity therapy, referral placed. I will continue to follow with a safe discharge plan.
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
## 2024-09-03 NOTE — PROGRESS NOTES
SW met with patient and significant other at bedside to update on high intensity PT recommendation. Patient confirmed she is agreeable to acute rehab. AR list provided. [facility] is [facility]. Referral submitted for review. EDOD listed is 9/4. Precert needed for discharge. Will continue to follow.     [id number] confirmed referral has been received and is under review. Pending acceptance. Will continue to follow.     1625-[facility] accepted and started precert. [facility] reports they will need OBGYN to document and clear pt for acute rehab and specify any conditions on medications for pt safety. SHAYNE will continue to follow.    SHANTANU Lockwood
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
## 2024-09-04 LAB
ALBUMIN SERPL BCP-MCNC: 3.5 G/DL (ref 3.4–5)
ANION GAP SERPL CALC-SCNC: 13 MMOL/L (ref 10–20)
B-HCG SERPL-ACNC: 1244 MIU/ML
BUN SERPL-MCNC: 10 MG/DL (ref 6–23)
CALCIUM SERPL-MCNC: 8.6 MG/DL (ref 8.6–10.6)
CHLORIDE SERPL-SCNC: 102 MMOL/L (ref 98–107)
CO2 SERPL-SCNC: 26 MMOL/L (ref 21–32)
CREAT SERPL-MCNC: 0.44 MG/DL (ref 0.5–1.05)
EGFRCR SERPLBLD CKD-EPI 2021: >90 ML/MIN/1.73M*2
ERYTHROCYTE [DISTWIDTH] IN BLOOD BY AUTOMATED COUNT: 11.9 % (ref 11.5–14.5)
GLUCOSE SERPL-MCNC: 78 MG/DL (ref 74–99)
HCT VFR BLD AUTO: 25.5 % (ref 36–46)
HGB BLD-MCNC: 8.7 G/DL (ref 12–16)
MCH RBC QN AUTO: 30.6 PG (ref 26–34)
MCHC RBC AUTO-ENTMCNC: 34.1 G/DL (ref 32–36)
MCV RBC AUTO: 90 FL (ref 80–100)
NRBC BLD-RTO: 0 /100 WBCS (ref 0–0)
PHOSPHATE SERPL-MCNC: 3.3 MG/DL (ref 2.5–4.9)
PLATELET # BLD AUTO: 184 X10*3/UL (ref 150–450)
POTASSIUM SERPL-SCNC: 3.4 MMOL/L (ref 3.5–5.3)
RBC # BLD AUTO: 2.84 X10*6/UL (ref 4–5.2)
SODIUM SERPL-SCNC: 138 MMOL/L (ref 136–145)
WBC # BLD AUTO: 7.2 X10*3/UL (ref 4.4–11.3)

## 2024-09-04 PROCEDURE — 36415 COLL VENOUS BLD VENIPUNCTURE: CPT

## 2024-09-04 PROCEDURE — 84702 CHORIONIC GONADOTROPIN TEST: CPT

## 2024-09-04 PROCEDURE — 2500000004 HC RX 250 GENERAL PHARMACY W/ HCPCS (ALT 636 FOR OP/ED): Performed by: NURSE PRACTITIONER

## 2024-09-04 PROCEDURE — 2500000004 HC RX 250 GENERAL PHARMACY W/ HCPCS (ALT 636 FOR OP/ED)

## 2024-09-04 PROCEDURE — 84100 ASSAY OF PHOSPHORUS: CPT

## 2024-09-04 PROCEDURE — 1100000001 HC PRIVATE ROOM DAILY

## 2024-09-04 PROCEDURE — 99231 SBSQ HOSP IP/OBS SF/LOW 25: CPT | Performed by: SURGERY

## 2024-09-04 PROCEDURE — 97530 THERAPEUTIC ACTIVITIES: CPT | Mod: GP | Performed by: PHYSICAL THERAPIST

## 2024-09-04 PROCEDURE — 2500000001 HC RX 250 WO HCPCS SELF ADMINISTERED DRUGS (ALT 637 FOR MEDICARE OP)

## 2024-09-04 PROCEDURE — 97116 GAIT TRAINING THERAPY: CPT | Mod: GP | Performed by: PHYSICAL THERAPIST

## 2024-09-04 PROCEDURE — 2500000001 HC RX 250 WO HCPCS SELF ADMINISTERED DRUGS (ALT 637 FOR MEDICARE OP): Performed by: NURSE PRACTITIONER

## 2024-09-04 PROCEDURE — 2500000005 HC RX 250 GENERAL PHARMACY W/O HCPCS

## 2024-09-04 PROCEDURE — 85027 COMPLETE CBC AUTOMATED: CPT

## 2024-09-04 RX ORDER — PYRIDOXINE HCL (VITAMIN B6) 25 MG
25 TABLET ORAL DAILY
Status: DISCONTINUED | OUTPATIENT
Start: 2024-09-04 | End: 2024-09-08 | Stop reason: HOSPADM

## 2024-09-04 RX ORDER — CYCLOBENZAPRINE HCL 10 MG
5 TABLET ORAL NIGHTLY
Status: DISCONTINUED | OUTPATIENT
Start: 2024-09-04 | End: 2024-09-05

## 2024-09-04 RX ORDER — OXYCODONE HYDROCHLORIDE 5 MG/1
5 TABLET ORAL EVERY 4 HOURS PRN
Status: DISCONTINUED | OUTPATIENT
Start: 2024-09-04 | End: 2024-09-05

## 2024-09-04 RX ORDER — HYDROMORPHONE HYDROCHLORIDE 2 MG/1
1 TABLET ORAL EVERY 4 HOURS PRN
Status: DISCONTINUED | OUTPATIENT
Start: 2024-09-04 | End: 2024-09-05

## 2024-09-04 RX ORDER — TALC
6 POWDER (GRAM) TOPICAL NIGHTLY
Status: DISCONTINUED | OUTPATIENT
Start: 2024-09-04 | End: 2024-09-05

## 2024-09-04 RX ORDER — ACETAMINOPHEN 325 MG/1
650 TABLET ORAL
Status: DISCONTINUED | OUTPATIENT
Start: 2024-09-04 | End: 2024-09-05

## 2024-09-04 RX ADMIN — PANTOPRAZOLE SODIUM 40 MG: 40 TABLET, DELAYED RELEASE ORAL at 06:15

## 2024-09-04 RX ADMIN — CYCLOBENZAPRINE 5 MG: 10 TABLET, FILM COATED ORAL at 20:17

## 2024-09-04 RX ADMIN — ACETAMINOPHEN 975 MG: 325 TABLET ORAL at 12:24

## 2024-09-04 RX ADMIN — OXYCODONE HYDROCHLORIDE 10 MG: 5 TABLET ORAL at 04:17

## 2024-09-04 RX ADMIN — Medication 25 MG: at 16:21

## 2024-09-04 RX ADMIN — OXYCODONE HYDROCHLORIDE 10 MG: 5 TABLET ORAL at 16:21

## 2024-09-04 RX ADMIN — Medication 6 MG: at 01:20

## 2024-09-04 RX ADMIN — ACETAMINOPHEN 975 MG: 325 TABLET ORAL at 18:22

## 2024-09-04 RX ADMIN — ENOXAPARIN SODIUM 30 MG: 100 INJECTION SUBCUTANEOUS at 08:18

## 2024-09-04 RX ADMIN — HYDROMORPHONE HYDROCHLORIDE 0.4 MG: 1 INJECTION, SOLUTION INTRAMUSCULAR; INTRAVENOUS; SUBCUTANEOUS at 02:19

## 2024-09-04 RX ADMIN — SENNOSIDES AND DOCUSATE SODIUM 2 TABLET: 50; 8.6 TABLET ORAL at 20:17

## 2024-09-04 RX ADMIN — OXYCODONE HYDROCHLORIDE 10 MG: 5 TABLET ORAL at 08:18

## 2024-09-04 RX ADMIN — OXYCODONE HYDROCHLORIDE 10 MG: 5 TABLET ORAL at 00:07

## 2024-09-04 RX ADMIN — PRENATAL VIT W/ FE FUMARATE-FA TAB 27-0.8 MG 1 TABLET: 27-0.8 TAB at 08:21

## 2024-09-04 RX ADMIN — HYDROMORPHONE HYDROCHLORIDE 1 MG: 2 TABLET ORAL at 20:16

## 2024-09-04 RX ADMIN — OXYCODONE HYDROCHLORIDE 10 MG: 5 TABLET ORAL at 12:24

## 2024-09-04 RX ADMIN — HYDROMORPHONE HYDROCHLORIDE 0.4 MG: 1 INJECTION, SOLUTION INTRAMUSCULAR; INTRAVENOUS; SUBCUTANEOUS at 06:14

## 2024-09-04 RX ADMIN — HYDROMORPHONE HYDROCHLORIDE 0.4 MG: 1 INJECTION, SOLUTION INTRAMUSCULAR; INTRAVENOUS; SUBCUTANEOUS at 14:09

## 2024-09-04 RX ADMIN — ONDANSETRON HYDROCHLORIDE 4 MG: 4 TABLET, FILM COATED ORAL at 07:06

## 2024-09-04 RX ADMIN — POLYETHYLENE GLYCOL 3350 17 G: 17 POWDER, FOR SOLUTION ORAL at 08:17

## 2024-09-04 RX ADMIN — ACETAMINOPHEN 975 MG: 325 TABLET ORAL at 00:06

## 2024-09-04 RX ADMIN — HYDROMORPHONE HYDROCHLORIDE 0.4 MG: 1 INJECTION, SOLUTION INTRAMUSCULAR; INTRAVENOUS; SUBCUTANEOUS at 18:22

## 2024-09-04 RX ADMIN — ACETAMINOPHEN 975 MG: 325 TABLET ORAL at 06:14

## 2024-09-04 RX ADMIN — HYDROMORPHONE HYDROCHLORIDE 0.4 MG: 1 INJECTION, SOLUTION INTRAMUSCULAR; INTRAVENOUS; SUBCUTANEOUS at 10:29

## 2024-09-04 RX ADMIN — Medication 6 MG: at 20:16

## 2024-09-04 RX ADMIN — ACETAMINOPHEN 650 MG: 325 TABLET ORAL at 22:03

## 2024-09-04 RX ADMIN — ENOXAPARIN SODIUM 30 MG: 100 INJECTION SUBCUTANEOUS at 20:16

## 2024-09-04 ASSESSMENT — COGNITIVE AND FUNCTIONAL STATUS - GENERAL
MOBILITY SCORE: 13
WALKING IN HOSPITAL ROOM: A LITTLE
MOVING TO AND FROM BED TO CHAIR: A LITTLE
DRESSING REGULAR LOWER BODY CLOTHING: A LOT
MOVING FROM LYING ON BACK TO SITTING ON SIDE OF FLAT BED WITH BEDRAILS: A LOT
STANDING UP FROM CHAIR USING ARMS: A LOT
HELP NEEDED FOR BATHING: A LOT
CLIMB 3 TO 5 STEPS WITH RAILING: TOTAL
WALKING IN HOSPITAL ROOM: A LOT
TURNING FROM BACK TO SIDE WHILE IN FLAT BAD: A LITTLE
STANDING UP FROM CHAIR USING ARMS: A LITTLE
TURNING FROM BACK TO SIDE WHILE IN FLAT BAD: A LOT
DAILY ACTIVITIY SCORE: 17
CLIMB 3 TO 5 STEPS WITH RAILING: TOTAL
DRESSING REGULAR UPPER BODY CLOTHING: A LITTLE
MOVING TO AND FROM BED TO CHAIR: A LOT
TOILETING: A LOT
MOBILITY SCORE: 14
MOVING FROM LYING ON BACK TO SITTING ON SIDE OF FLAT BED WITH BEDRAILS: A LITTLE

## 2024-09-04 ASSESSMENT — PAIN SCALES - GENERAL
PAINLEVEL_OUTOF10: 8
PAINLEVEL_OUTOF10: 5 - MODERATE PAIN
PAINLEVEL_OUTOF10: 6
PAINLEVEL_OUTOF10: 7
PAINLEVEL_OUTOF10: 7
PAINLEVEL_OUTOF10: 6
PAINLEVEL_OUTOF10: 9
PAINLEVEL_OUTOF10: 9
PAINLEVEL_OUTOF10: 7
PAINLEVEL_OUTOF10: 6
PAINLEVEL_OUTOF10: 7
PAINLEVEL_OUTOF10: 9
PAINLEVEL_OUTOF10: 9
PAINLEVEL_OUTOF10: 7
PAINLEVEL_OUTOF10: 6
PAINLEVEL_OUTOF10: 6
PAINLEVEL_OUTOF10: 7
PAINLEVEL_OUTOF10: 8

## 2024-09-04 ASSESSMENT — PAIN - FUNCTIONAL ASSESSMENT
PAIN_FUNCTIONAL_ASSESSMENT: 0-10

## 2024-09-04 ASSESSMENT — PAIN DESCRIPTION - DESCRIPTORS
DESCRIPTORS: ACHING
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
## 2024-09-04 NOTE — PROGRESS NOTES
Physical Therapy                 Therapy Communication Note    Patient Name: Rosa Saba  MRN: [id number]  Today's Date: 9/4/2024     Discipline: Physical Therapy    Missed Visit Reason: Missed Visit Reason: Other (Comment), Patient refused (pt with headache and nausea, would like PT to come back)    Missed Time: Attempt    Comment: 8:22am
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
## 2024-09-04 NOTE — PROGRESS NOTES
SW met with patient at bedside to update on acceptance at [facility]. Patient confirmed she is agreeable. [facility] Cee reports they will need clarification on what meds are not ok and ok to give pt due to pregnancy. Pending precert. SW will continue to follow.     1623-[facility] confirmed auth is received. SW spoke with patients sister via room phone. Sister reports patient is not ready for dc and requested to speak with team. SW notified bedside RN and TCC. TCC reports pending pain control. SHAYNE updated Yazmin at [facility]. Will continue to follow.     SHANTANU Lockwood
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
## 2024-09-04 NOTE — CARE PLAN
Problem: Fall/Injury  Goal: Not fall by end of shift  9/4/2024 0401 by Cherelle Medel RN  Outcome: Progressing  9/4/2024 0401 by Cherelle Medel RN  Outcome: Progressing  Goal: Be free from injury by end of the shift  9/4/2024 0401 by Cherelle Medel RN  Outcome: Progressing  9/4/2024 0401 by Cherelle Medel RN  Outcome: Progressing  Goal: Verbalize understanding of personal risk factors for fall in the hospital  9/4/2024 0401 by Cherelle Medel RN  Outcome: Progressing  9/4/2024 0401 by Cherelle Medel RN  Outcome: Progressing  Goal: Verbalize understanding of risk factor reduction measures to prevent injury from fall in the home  9/4/2024 0401 by Cherelle Medel RN  Outcome: Progressing  9/4/2024 0401 by Cherelle Medel RN  Outcome: Progressing  Goal: Use assistive devices by end of the shift  9/4/2024 0401 by Cherelle Medel RN  Outcome: Progressing  9/4/2024 0401 by Cherelle Medel RN  Outcome: Progressing  Goal: Pace activities to prevent fatigue by end of the shift  9/4/2024 0401 by Cherelle Medel RN  Outcome: Progressing  9/4/2024 0401 by Cherelle Medel RN  Outcome: Progressing     Problem: Pain  Goal: Takes deep breaths with improved pain control throughout the shift  9/4/2024 0401 by Cherelle Medel RN  Outcome: Progressing  9/4/2024 0401 by Cherelle Medel RN  Outcome: Progressing  Goal: Turns in bed with improved pain control throughout the shift  9/4/2024 0401 by Cherelle Medel RN  Outcome: Progressing  9/4/2024 0401 by Cherelle Medel RN  Outcome: Progressing  Goal: Walks with improved pain control throughout the shift  9/4/2024 0401 by Cherelle Medel RN  Outcome: Progressing  9/4/2024 0401 by Cherelle Medel RN  Outcome: Progressing  Goal: Performs ADL's with improved pain control throughout shift  9/4/2024 0401 by Cherelle Medel RN  Outcome: Progressing  9/4/2024 0401 by Cherelle Medel RN  Outcome: Progressing  Goal: Participates in PT with improved  pain control throughout the shift  9/4/2024 0401 by Cherelle Medel RN  Outcome: Progressing  9/4/2024 0401 by Cherelle Medel RN  Outcome: Progressing  Goal: Free from opioid side effects throughout the shift  9/4/2024 0401 by Cherelle Medel RN  Outcome: Progressing  9/4/2024 0401 by Cherelle Medel RN  Outcome: Progressing  Goal: Free from acute confusion related to pain meds throughout the shift  9/4/2024 0401 by Cherelle Medel RN  Outcome: Progressing  9/4/2024 0401 by Cherelle Medel RN  Outcome: Progressing   The patient's goals for the shift include      The clinical goals for the shift include pt will remain safe and have pain managed throughout my shift
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
## 2024-09-04 NOTE — SIGNIFICANT EVENT
Recommendation for medications that are safe during pregnancy:    Pain Management Recommendations in Pregnancy:   - Acetaminophen use is safe in pregnancy and may be used for pain and fever management.   - NSAIDs, other than low dose aspirin, for more than 48 hrs can cause in utero constriction of the ductus arteriosus as early as 24 weeks and should be avoided.  - Opioids may be used for short-term analgesia. Long term opioid use is associated with  abstinence syndrome.  - For muscular aches and spasms, Flexeril can be used    Nausea Management in Pregnancy:  - First line medications for early pregnancy nausea include Unisom and vitamin B6  - Zofran, Reglan, Benadryl, and Scopolamine patches are also safe for continued nausea symptoms    Safety of Antibiotics in Pregnancy:  - Antibiotics without known teratogenic effects include: cephalosporins, penicillins, erythromycin, azithromycin, clindamycin, augmentin, and metronidazole.  - The following antibiotics have been associated with known or potential teratogenic effects: amninoglycosides, doxycycline, fluoroquinolones, trimethoprim, sulfonamides, nitrofurantoin (only in the first trimester).     Imaging Recommendations in Pregnancy:  - Ultrasonography and MRI are not associated with risk and are the imaging techniques of choice for the pregnant patient.  - With few exceptions, radiation exposure through radiography, CT scan, or nuclear medicine imaging techniques is at a dose much lower than the exposure associated with fetal harm. For example, the fetal radiation dose of one CXR is 0.0005-0.01 mGy, and the threshold dose of radiation-induced teratogenesis is  mGy. If these techniques are necessary in addition to US or MRI, or are more readily available, they should not be withheld from a pregnant patient.   - The use of gadolinium contrast with MRI should be limited; it may be used if it significantly improves diagnostic performance. Breastfeeding  should not be interrupted after gadolinium administration.    Anticoagulation in Pregnancy  - Heparin and Lovenox are safe at all gestational ages  - Warfarin and other Vitamin K antagonists are generally avoided in pregnancy. Patient who require long term anticoagulation should be started and maintained on heparin derivatives.     Blood Pressure Management  - Blood pressure of 140/90 or higher is considered elevated in pregnancy. Blood pressure of 160/110 is considered severe range and typically warrants rapid treatment to avoid seizure or stroke. Will defer BP management to primary team   - Antihypertensives safe in pregnancy include beta blockers, amlodipine, nifedipine, and thiazides.   - Antihypertensives to avoid in pregnancy include:     - ACE inhibitors, ARBs, and direct renin inhibitors are associated with significant fetal renal abnormalities     - Mineralocorticoid receptor antagonists have not been proven to be safe in pregnancy with particular concern for the anti-androgenic activity of spironolactone     - Nitroprusside due to possibility of fetal cyanide poisoning    Please note, "[employer] (available through the  intranet via [employer]) is a reliable resource for drug safety profiles in pregnancy and breastfeeding. We are happy to provide assistance with the care of this patient. Please contact the team pager with any questions or concerns.    Warren Draper MD, PGY-1  GYN Pager [phone]
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
## 2024-09-04 NOTE — CARE PLAN
The clinical goals for the shift include patients pain will be well managed by end of shift      Problem: Fall/Injury  Goal: Not fall by end of shift  Outcome: Progressing  Goal: Be free from injury by end of the shift  Outcome: Progressing  Goal: Verbalize understanding of personal risk factors for fall in the hospital  Outcome: Progressing  Goal: Verbalize understanding of risk factor reduction measures to prevent injury from fall in the home  Outcome: Progressing  Goal: Use assistive devices by end of the shift  Outcome: Progressing  Goal: Pace activities to prevent fatigue by end of the shift  Outcome: Progressing     Problem: Pain  Goal: Takes deep breaths with improved pain control throughout the shift  Outcome: Progressing  Goal: Turns in bed with improved pain control throughout the shift  Outcome: Progressing  Goal: Walks with improved pain control throughout the shift  Outcome: Progressing  Goal: Performs ADL's with improved pain control throughout shift  Outcome: Progressing  Goal: Participates in PT with improved pain control throughout the shift  Outcome: Progressing  Goal: Free from opioid side effects throughout the shift  Outcome: Progressing  Goal: Free from acute confusion related to pain meds throughout the shift  Outcome: Progressing     Problem: Pain - Adult  Goal: Verbalizes/displays adequate comfort level or baseline comfort level  Outcome: Progressing     Problem: Safety - Adult  Goal: Free from fall injury  Outcome: Progressing     Problem: Discharge Planning  Goal: Discharge to home or other facility with appropriate resources  Outcome: Progressing     Problem: Chronic Conditions and Co-morbidities  Goal: Patient's chronic conditions and co-morbidity symptoms are monitored and maintained or improved  Outcome: Progressing
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
## 2024-09-05 LAB
ALBUMIN SERPL BCP-MCNC: 3.8 G/DL (ref 3.4–5)
ANION GAP SERPL CALC-SCNC: 13 MMOL/L (ref 10–20)
APPEARANCE UR: CLEAR
BILIRUB UR STRIP.AUTO-MCNC: NEGATIVE MG/DL
BUN SERPL-MCNC: 9 MG/DL (ref 6–23)
CALCIUM SERPL-MCNC: 9.2 MG/DL (ref 8.6–10.6)
CHLORIDE SERPL-SCNC: 99 MMOL/L (ref 98–107)
CO2 SERPL-SCNC: 26 MMOL/L (ref 21–32)
COLOR UR: YELLOW
CREAT SERPL-MCNC: 0.37 MG/DL (ref 0.5–1.05)
EGFRCR SERPLBLD CKD-EPI 2021: >90 ML/MIN/1.73M*2
ERYTHROCYTE [DISTWIDTH] IN BLOOD BY AUTOMATED COUNT: 12 % (ref 11.5–14.5)
ERYTHROCYTE [DISTWIDTH] IN BLOOD BY AUTOMATED COUNT: 12.1 % (ref 11.5–14.5)
GLUCOSE SERPL-MCNC: 83 MG/DL (ref 74–99)
GLUCOSE UR STRIP.AUTO-MCNC: NORMAL MG/DL
HCT VFR BLD AUTO: 28.3 % (ref 36–46)
HCT VFR BLD AUTO: 28.3 % (ref 36–46)
HGB BLD-MCNC: 9.6 G/DL (ref 12–16)
HGB BLD-MCNC: 9.6 G/DL (ref 12–16)
KETONES UR STRIP.AUTO-MCNC: ABNORMAL MG/DL
LEUKOCYTE ESTERASE UR QL STRIP.AUTO: ABNORMAL
MAGNESIUM SERPL-MCNC: 1.68 MG/DL (ref 1.6–2.4)
MCH RBC QN AUTO: 30.5 PG (ref 26–34)
MCH RBC QN AUTO: 30.7 PG (ref 26–34)
MCHC RBC AUTO-ENTMCNC: 33.9 G/DL (ref 32–36)
MCHC RBC AUTO-ENTMCNC: 33.9 G/DL (ref 32–36)
MCV RBC AUTO: 90 FL (ref 80–100)
MCV RBC AUTO: 90 FL (ref 80–100)
MUCOUS THREADS #/AREA URNS AUTO: NORMAL /LPF
NITRITE UR QL STRIP.AUTO: NEGATIVE
NRBC BLD-RTO: 0 /100 WBCS (ref 0–0)
NRBC BLD-RTO: 0 /100 WBCS (ref 0–0)
PH UR STRIP.AUTO: 6.5 [PH]
PHOSPHATE SERPL-MCNC: 3.8 MG/DL (ref 2.5–4.9)
PLATELET # BLD AUTO: 228 X10*3/UL (ref 150–450)
PLATELET # BLD AUTO: 235 X10*3/UL (ref 150–450)
POTASSIUM SERPL-SCNC: 3.7 MMOL/L (ref 3.5–5.3)
PROT UR STRIP.AUTO-MCNC: ABNORMAL MG/DL
RBC # BLD AUTO: 3.13 X10*6/UL (ref 4–5.2)
RBC # BLD AUTO: 3.15 X10*6/UL (ref 4–5.2)
RBC # UR STRIP.AUTO: NEGATIVE /UL
RBC #/AREA URNS AUTO: NORMAL /HPF
SODIUM SERPL-SCNC: 134 MMOL/L (ref 136–145)
SP GR UR STRIP.AUTO: 1.03
SQUAMOUS #/AREA URNS AUTO: NORMAL /HPF
UROBILINOGEN UR STRIP.AUTO-MCNC: NORMAL MG/DL
WBC # BLD AUTO: 12 X10*3/UL (ref 4.4–11.3)
WBC # BLD AUTO: 9.5 X10*3/UL (ref 4.4–11.3)
WBC #/AREA URNS AUTO: NORMAL /HPF

## 2024-09-05 PROCEDURE — 2500000005 HC RX 250 GENERAL PHARMACY W/O HCPCS

## 2024-09-05 PROCEDURE — 36415 COLL VENOUS BLD VENIPUNCTURE: CPT

## 2024-09-05 PROCEDURE — 2500000001 HC RX 250 WO HCPCS SELF ADMINISTERED DRUGS (ALT 637 FOR MEDICARE OP)

## 2024-09-05 PROCEDURE — 1100000001 HC PRIVATE ROOM DAILY

## 2024-09-05 PROCEDURE — 99255 IP/OBS CONSLTJ NEW/EST HI 80: CPT | Performed by: STUDENT IN AN ORGANIZED HEALTH CARE EDUCATION/TRAINING PROGRAM

## 2024-09-05 PROCEDURE — 85027 COMPLETE CBC AUTOMATED: CPT

## 2024-09-05 PROCEDURE — 80069 RENAL FUNCTION PANEL: CPT

## 2024-09-05 PROCEDURE — 83735 ASSAY OF MAGNESIUM: CPT

## 2024-09-05 PROCEDURE — 2500000004 HC RX 250 GENERAL PHARMACY W/ HCPCS (ALT 636 FOR OP/ED)

## 2024-09-05 PROCEDURE — 87205 SMEAR GRAM STAIN: CPT

## 2024-09-05 PROCEDURE — 97530 THERAPEUTIC ACTIVITIES: CPT | Mod: GO

## 2024-09-05 PROCEDURE — 81001 URINALYSIS AUTO W/SCOPE: CPT

## 2024-09-05 PROCEDURE — 2500000004 HC RX 250 GENERAL PHARMACY W/ HCPCS (ALT 636 FOR OP/ED): Performed by: NURSE PRACTITIONER

## 2024-09-05 PROCEDURE — 2500000001 HC RX 250 WO HCPCS SELF ADMINISTERED DRUGS (ALT 637 FOR MEDICARE OP): Performed by: NURSE PRACTITIONER

## 2024-09-05 PROCEDURE — 87086 URINE CULTURE/COLONY COUNT: CPT

## 2024-09-05 RX ORDER — CYCLOBENZAPRINE HCL 10 MG
5 TABLET ORAL ONCE
Status: COMPLETED | OUTPATIENT
Start: 2024-09-06 | End: 2024-09-05

## 2024-09-05 RX ORDER — ACETAMINOPHEN 325 MG/1
975 TABLET ORAL 4 TIMES DAILY
Status: DISCONTINUED | OUTPATIENT
Start: 2024-09-05 | End: 2024-09-08 | Stop reason: HOSPADM

## 2024-09-05 RX ORDER — MAGNESIUM SULFATE HEPTAHYDRATE 40 MG/ML
2 INJECTION, SOLUTION INTRAVENOUS NIGHTLY
Status: COMPLETED | OUTPATIENT
Start: 2024-09-05 | End: 2024-09-06

## 2024-09-05 RX ORDER — HYDROMORPHONE HYDROCHLORIDE 2 MG/1
2 TABLET ORAL
Status: DISCONTINUED | OUTPATIENT
Start: 2024-09-05 | End: 2024-09-06

## 2024-09-05 RX ORDER — HYDROMORPHONE HYDROCHLORIDE 2 MG/1
2 TABLET ORAL
Status: DISCONTINUED | OUTPATIENT
Start: 2024-09-05 | End: 2024-09-05

## 2024-09-05 RX ORDER — HYDROMORPHONE HYDROCHLORIDE 1 MG/ML
0.5 INJECTION, SOLUTION INTRAMUSCULAR; INTRAVENOUS; SUBCUTANEOUS ONCE
Status: COMPLETED | OUTPATIENT
Start: 2024-09-05 | End: 2024-09-05

## 2024-09-05 RX ORDER — ACETAMINOPHEN 500 MG
10 TABLET ORAL NIGHTLY
Status: DISCONTINUED | OUTPATIENT
Start: 2024-09-05 | End: 2024-09-08 | Stop reason: HOSPADM

## 2024-09-05 RX ORDER — CYCLOBENZAPRINE HCL 10 MG
5 TABLET ORAL EVERY MORNING
Status: DISCONTINUED | OUTPATIENT
Start: 2024-09-06 | End: 2024-09-06

## 2024-09-05 RX ORDER — HYDROMORPHONE HYDROCHLORIDE 1 MG/ML
0.4 INJECTION, SOLUTION INTRAMUSCULAR; INTRAVENOUS; SUBCUTANEOUS EVERY 2 HOUR PRN
Status: DISCONTINUED | OUTPATIENT
Start: 2024-09-05 | End: 2024-09-06

## 2024-09-05 RX ORDER — LIDOCAINE 560 MG/1
2 PATCH PERCUTANEOUS; TOPICAL; TRANSDERMAL DAILY
Status: DISCONTINUED | OUTPATIENT
Start: 2024-09-05 | End: 2024-09-08 | Stop reason: HOSPADM

## 2024-09-05 RX ORDER — HYDROMORPHONE HYDROCHLORIDE 2 MG/1
4 TABLET ORAL
Status: DISCONTINUED | OUTPATIENT
Start: 2024-09-05 | End: 2024-09-06

## 2024-09-05 RX ORDER — CYCLOBENZAPRINE HCL 10 MG
10 TABLET ORAL NIGHTLY
Status: DISCONTINUED | OUTPATIENT
Start: 2024-09-05 | End: 2024-09-08 | Stop reason: HOSPADM

## 2024-09-05 RX ORDER — KETOROLAC TROMETHAMINE 15 MG/ML
15 INJECTION, SOLUTION INTRAMUSCULAR; INTRAVENOUS EVERY 8 HOURS SCHEDULED
Status: DISCONTINUED | OUTPATIENT
Start: 2024-09-05 | End: 2024-09-05

## 2024-09-05 RX ORDER — CYCLOBENZAPRINE HCL 10 MG
5 TABLET ORAL 3 TIMES DAILY
Status: DISCONTINUED | OUTPATIENT
Start: 2024-09-05 | End: 2024-09-05

## 2024-09-05 RX ADMIN — ONDANSETRON 4 MG: 2 INJECTION INTRAMUSCULAR; INTRAVENOUS at 22:43

## 2024-09-05 RX ADMIN — HYDROMORPHONE HYDROCHLORIDE 0.4 MG: 1 INJECTION, SOLUTION INTRAMUSCULAR; INTRAVENOUS; SUBCUTANEOUS at 23:04

## 2024-09-05 RX ADMIN — HYDROMORPHONE HYDROCHLORIDE 0.4 MG: 1 INJECTION, SOLUTION INTRAMUSCULAR; INTRAVENOUS; SUBCUTANEOUS at 20:58

## 2024-09-05 RX ADMIN — OXYCODONE HYDROCHLORIDE 10 MG: 5 TABLET ORAL at 12:47

## 2024-09-05 RX ADMIN — LIDOCAINE 2 PATCH: 4 PATCH TOPICAL at 18:21

## 2024-09-05 RX ADMIN — SODIUM CHLORIDE 1000 ML: 9 INJECTION, SOLUTION INTRAVENOUS at 22:00

## 2024-09-05 RX ADMIN — HYDROMORPHONE HYDROCHLORIDE 1 MG: 2 TABLET ORAL at 09:25

## 2024-09-05 RX ADMIN — SENNOSIDES AND DOCUSATE SODIUM 2 TABLET: 50; 8.6 TABLET ORAL at 21:26

## 2024-09-05 RX ADMIN — HYDROMORPHONE HYDROCHLORIDE 4 MG: 2 TABLET ORAL at 21:26

## 2024-09-05 RX ADMIN — MAGNESIUM SULFATE HEPTAHYDRATE 2 G: 40 INJECTION, SOLUTION INTRAVENOUS at 22:00

## 2024-09-05 RX ADMIN — HYDROMORPHONE HYDROCHLORIDE 0.5 MG: 1 INJECTION, SOLUTION INTRAMUSCULAR; INTRAVENOUS; SUBCUTANEOUS at 04:05

## 2024-09-05 RX ADMIN — HYDROMORPHONE HYDROCHLORIDE 1 MG: 2 TABLET ORAL at 00:15

## 2024-09-05 RX ADMIN — HYDROMORPHONE HYDROCHLORIDE 1 MG: 2 TABLET ORAL at 05:20

## 2024-09-05 RX ADMIN — ACETAMINOPHEN 650 MG: 325 TABLET ORAL at 02:23

## 2024-09-05 RX ADMIN — HYDROMORPHONE HYDROCHLORIDE 2 MG: 2 TABLET ORAL at 14:37

## 2024-09-05 RX ADMIN — Medication 10 MG: at 21:26

## 2024-09-05 RX ADMIN — ACETAMINOPHEN 975 MG: 325 TABLET ORAL at 21:26

## 2024-09-05 RX ADMIN — POLYETHYLENE GLYCOL 3350 17 G: 17 POWDER, FOR SOLUTION ORAL at 08:25

## 2024-09-05 RX ADMIN — PANTOPRAZOLE SODIUM 40 MG: 40 TABLET, DELAYED RELEASE ORAL at 06:23

## 2024-09-05 RX ADMIN — ACETAMINOPHEN 650 MG: 325 TABLET ORAL at 06:23

## 2024-09-05 RX ADMIN — Medication 25 MG: at 08:26

## 2024-09-05 RX ADMIN — CYCLOBENZAPRINE 5 MG: 10 TABLET, FILM COATED ORAL at 14:50

## 2024-09-05 RX ADMIN — PRENATAL VIT W/ FE FUMARATE-FA TAB 27-0.8 MG 1 TABLET: 27-0.8 TAB at 08:26

## 2024-09-05 RX ADMIN — ENOXAPARIN SODIUM 30 MG: 100 INJECTION SUBCUTANEOUS at 08:25

## 2024-09-05 RX ADMIN — OXYCODONE HYDROCHLORIDE 10 MG: 5 TABLET ORAL at 08:25

## 2024-09-05 RX ADMIN — HYDROMORPHONE HYDROCHLORIDE 2 MG: 2 TABLET ORAL at 18:22

## 2024-09-05 RX ADMIN — SCOPOLAMINE 1 PATCH: 1.5 PATCH, EXTENDED RELEASE TRANSDERMAL at 05:47

## 2024-09-05 RX ADMIN — ACETAMINOPHEN 650 MG: 325 TABLET ORAL at 12:47

## 2024-09-05 RX ADMIN — CYCLOBENZAPRINE 10 MG: 10 TABLET, FILM COATED ORAL at 21:26

## 2024-09-05 RX ADMIN — OXYCODONE HYDROCHLORIDE 10 MG: 5 TABLET ORAL at 03:41

## 2024-09-05 ASSESSMENT — PAIN SCALES - GENERAL
PAINLEVEL_OUTOF10: 8
PAINLEVEL_OUTOF10: 10 - WORST POSSIBLE PAIN
PAINLEVEL_OUTOF10: 8
PAINLEVEL_OUTOF10: 8
PAINLEVEL_OUTOF10: 7
PAINLEVEL_OUTOF10: 10 - WORST POSSIBLE PAIN
PAINLEVEL_OUTOF10: 8
PAINLEVEL_OUTOF10: 9
PAINLEVEL_OUTOF10: 8
PAINLEVEL_OUTOF10: 10 - WORST POSSIBLE PAIN
PAINLEVEL_OUTOF10: 10 - WORST POSSIBLE PAIN
PAINLEVEL_OUTOF10: 8
PAINLEVEL_OUTOF10: 10 - WORST POSSIBLE PAIN
PAINLEVEL_OUTOF10: 9
PAINLEVEL_OUTOF10: 8
PAINLEVEL_OUTOF10: 10 - WORST POSSIBLE PAIN
PAINLEVEL_OUTOF10: 10 - WORST POSSIBLE PAIN
PAINLEVEL_OUTOF10: 9

## 2024-09-05 ASSESSMENT — COGNITIVE AND FUNCTIONAL STATUS - GENERAL
MOVING TO AND FROM BED TO CHAIR: A LOT
TURNING FROM BACK TO SIDE WHILE IN FLAT BAD: A LITTLE
DRESSING REGULAR LOWER BODY CLOTHING: A LOT
PERSONAL GROOMING: A LITTLE
TOILETING: A LOT
WALKING IN HOSPITAL ROOM: A LOT
CLIMB 3 TO 5 STEPS WITH RAILING: TOTAL
TOILETING: A LOT
DAILY ACTIVITIY SCORE: 16
DRESSING REGULAR UPPER BODY CLOTHING: A LITTLE
HELP NEEDED FOR BATHING: A LOT
MOVING FROM LYING ON BACK TO SITTING ON SIDE OF FLAT BED WITH BEDRAILS: A LITTLE
MOBILITY SCORE: 13
HELP NEEDED FOR BATHING: A LOT
DAILY ACTIVITIY SCORE: 17
DRESSING REGULAR LOWER BODY CLOTHING: A LOT
DRESSING REGULAR UPPER BODY CLOTHING: A LITTLE
STANDING UP FROM CHAIR USING ARMS: A LOT

## 2024-09-05 ASSESSMENT — PAIN DESCRIPTION - DESCRIPTORS
DESCRIPTORS: ACHING
DESCRIPTORS: ACHING;THROBBING
DESCRIPTORS: ACHING
DESCRIPTORS: ACHING;THROBBING

## 2024-09-05 ASSESSMENT — PAIN DESCRIPTION - LOCATION: LOCATION: HIP

## 2024-09-05 NOTE — CARE PLAN
The patient's goals for the shift include Pain controlled to a level of 4-6 by the end of the shift    The clinical goals for the shift include Plan for discharge to rehab by the end of the shift    Over the shift, the patient did not make progress toward the following goals. Barriers to progression include   . Recommendations to address these barriers include   .

## 2024-09-05 NOTE — CARE PLAN
The patient's goals for the shift include      The clinical goals for the shift include pt will have pain managed throughout my shift      Problem: Fall/Injury  Goal: Not fall by end of shift  Outcome: Progressing  Goal: Be free from injury by end of the shift  Outcome: Progressing  Goal: Verbalize understanding of personal risk factors for fall in the hospital  Outcome: Progressing  Goal: Verbalize understanding of risk factor reduction measures to prevent injury from fall in the home  Outcome: Progressing  Goal: Use assistive devices by end of the shift  Outcome: Progressing  Goal: Pace activities to prevent fatigue by end of the shift  Outcome: Progressing     Problem: Pain  Goal: Takes deep breaths with improved pain control throughout the shift  Outcome: Progressing  Goal: Turns in bed with improved pain control throughout the shift  Outcome: Progressing  Goal: Walks with improved pain control throughout the shift  Outcome: Progressing  Goal: Performs ADL's with improved pain control throughout shift  Outcome: Progressing  Goal: Participates in PT with improved pain control throughout the shift  Outcome: Progressing  Goal: Free from opioid side effects throughout the shift  Outcome: Progressing  Goal: Free from acute confusion related to pain meds throughout the shift  Outcome: Progressing     Problem: Pain - Adult  Goal: Verbalizes/displays adequate comfort level or baseline comfort level  Outcome: Progressing     Problem: Safety - Adult  Goal: Free from fall injury  Outcome: Progressing     Problem: Discharge Planning  Goal: Discharge to home or other facility with appropriate resources  Outcome: Progressing     Problem: Chronic Conditions and Co-morbidities  Goal: Patient's chronic conditions and co-morbidity symptoms are monitored and maintained or improved  Outcome: Progressing
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
## 2024-09-05 NOTE — PROGRESS NOTES
Physical Therapy                 Therapy Communication Note    Patient Name: Rosa Saba  MRN: [id number]  Today's Date: 9/5/2024     Discipline: Physical Therapy    Missed Visit Reason: Missed Visit Reason:  (Pt currently experiencing increase in pain, per family present sat up at EOB just returned to supine. Will continue to follow per POC.)    Spoke with OT @ 14:20 reports pt in a lot of pain after re-positioning in bed, and per RN @15:02 pt did not want to participate in PT. Will re-attempt once pt is appropriate and per PT POC.    Missed Time: Attempt 10:40    09/05/24 at 11:29 AM   Greta Nguyen PTA   Rehab Office: [phone]
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
## 2024-09-05 NOTE — NURSING NOTE
0825 Medicated with oxycodone at this time for pain rated as severe. VS per flowsheet. Repositioned for comfort. Family at bedside and updated on medication regimen.    0840 Notified team that patient c/o vaginal itching, discharge, and burning. Also patient reported body twitches that resulted in her jerking and causing pain to increase and not allowing her to rest.    925 Reassessed pain and remained severe. Given PRN dilaudid at this time.    1050 Pain reassessed and down to a 7/10. Patient wishes to not be disturbed for a few hours to get rest. Call light in reach.    1247 Oxycodone and tylenol given at this time for pain 8/10 to right hip.    1330 Pain reassessed and still a above. Notified team that this pain regimen is not effective for patient. Acute pain to be consulted.     1351 EKG performed for elevated 's. EKG shows ST. Dr. Montesinos notified. Order for labs obtained and order for UA/C&S.    1437 Medicated for pain. See MAR.    1607 Labs drawn and sent    1703 Assisted Dr. Draper with vaginal swab for above listed complants    1835 UA/ C&S obtained and sent.    1920 Handoff report given to oncoming RN Yazmin.

## 2024-09-06 LAB
ALBUMIN SERPL BCP-MCNC: 3.5 G/DL (ref 3.4–5)
ANION GAP SERPL CALC-SCNC: 12 MMOL/L (ref 10–20)
BACTERIA UR CULT: NORMAL
BASOPHILS # BLD AUTO: 0.01 X10*3/UL (ref 0–0.1)
BASOPHILS NFR BLD AUTO: 0.1 %
BUN SERPL-MCNC: 9 MG/DL (ref 6–23)
CALCIUM SERPL-MCNC: 8.6 MG/DL (ref 8.6–10.6)
CHLORIDE SERPL-SCNC: 99 MMOL/L (ref 98–107)
CO2 SERPL-SCNC: 25 MMOL/L (ref 21–32)
CREAT SERPL-MCNC: 0.41 MG/DL (ref 0.5–1.05)
EGFRCR SERPLBLD CKD-EPI 2021: >90 ML/MIN/1.73M*2
EOSINOPHIL # BLD AUTO: 0.05 X10*3/UL (ref 0–0.7)
EOSINOPHIL NFR BLD AUTO: 0.5 %
ERYTHROCYTE [DISTWIDTH] IN BLOOD BY AUTOMATED COUNT: 12 % (ref 11.5–14.5)
GLUCOSE SERPL-MCNC: 96 MG/DL (ref 74–99)
HCT VFR BLD AUTO: 26.1 % (ref 36–46)
HGB BLD-MCNC: 9 G/DL (ref 12–16)
HOLD SPECIMEN: NORMAL
IMM GRANULOCYTES # BLD AUTO: 0.06 X10*3/UL (ref 0–0.7)
IMM GRANULOCYTES NFR BLD AUTO: 0.6 % (ref 0–0.9)
LYMPHOCYTES # BLD AUTO: 1.4 X10*3/UL (ref 1.2–4.8)
LYMPHOCYTES NFR BLD AUTO: 13.1 %
MAGNESIUM SERPL-MCNC: 2.94 MG/DL (ref 1.6–2.4)
MCH RBC QN AUTO: 31.3 PG (ref 26–34)
MCHC RBC AUTO-ENTMCNC: 34.5 G/DL (ref 32–36)
MCV RBC AUTO: 91 FL (ref 80–100)
MONOCYTES # BLD AUTO: 1.46 X10*3/UL (ref 0.1–1)
MONOCYTES NFR BLD AUTO: 13.6 %
NEUTROPHILS # BLD AUTO: 7.72 X10*3/UL (ref 1.2–7.7)
NEUTROPHILS NFR BLD AUTO: 72.1 %
NRBC BLD-RTO: 0 /100 WBCS (ref 0–0)
PHOSPHATE SERPL-MCNC: 2.6 MG/DL (ref 2.5–4.9)
PLATELET # BLD AUTO: 252 X10*3/UL (ref 150–450)
POTASSIUM SERPL-SCNC: 3.3 MMOL/L (ref 3.5–5.3)
RBC # BLD AUTO: 2.88 X10*6/UL (ref 4–5.2)
SODIUM SERPL-SCNC: 133 MMOL/L (ref 136–145)
WBC # BLD AUTO: 10.7 X10*3/UL (ref 4.4–11.3)

## 2024-09-06 PROCEDURE — 1100000001 HC PRIVATE ROOM DAILY

## 2024-09-06 PROCEDURE — 2500000004 HC RX 250 GENERAL PHARMACY W/ HCPCS (ALT 636 FOR OP/ED)

## 2024-09-06 PROCEDURE — 2500000001 HC RX 250 WO HCPCS SELF ADMINISTERED DRUGS (ALT 637 FOR MEDICARE OP)

## 2024-09-06 PROCEDURE — 85025 COMPLETE CBC W/AUTO DIFF WBC: CPT

## 2024-09-06 PROCEDURE — 97530 THERAPEUTIC ACTIVITIES: CPT | Mod: GO

## 2024-09-06 PROCEDURE — 97116 GAIT TRAINING THERAPY: CPT | Mod: GP,CQ

## 2024-09-06 PROCEDURE — 97530 THERAPEUTIC ACTIVITIES: CPT | Mod: GP,CQ

## 2024-09-06 PROCEDURE — 36415 COLL VENOUS BLD VENIPUNCTURE: CPT

## 2024-09-06 PROCEDURE — 2500000001 HC RX 250 WO HCPCS SELF ADMINISTERED DRUGS (ALT 637 FOR MEDICARE OP): Performed by: NURSE PRACTITIONER

## 2024-09-06 PROCEDURE — 83735 ASSAY OF MAGNESIUM: CPT

## 2024-09-06 PROCEDURE — 2500000005 HC RX 250 GENERAL PHARMACY W/O HCPCS

## 2024-09-06 PROCEDURE — 80069 RENAL FUNCTION PANEL: CPT

## 2024-09-06 PROCEDURE — 97535 SELF CARE MNGMENT TRAINING: CPT | Mod: GO

## 2024-09-06 RX ORDER — POLYETHYLENE GLYCOL 3350 17 G/17G
17 POWDER, FOR SOLUTION ORAL 2 TIMES DAILY
Status: DISCONTINUED | OUTPATIENT
Start: 2024-09-06 | End: 2024-09-08 | Stop reason: HOSPADM

## 2024-09-06 RX ORDER — HYDROMORPHONE HYDROCHLORIDE 2 MG/1
2 TABLET ORAL EVERY 4 HOURS PRN
Status: DISCONTINUED | OUTPATIENT
Start: 2024-09-06 | End: 2024-09-08 | Stop reason: HOSPADM

## 2024-09-06 RX ORDER — POTASSIUM CHLORIDE 1.5 G/1.58G
40 POWDER, FOR SOLUTION ORAL ONCE
Status: COMPLETED | OUTPATIENT
Start: 2024-09-06 | End: 2024-09-06

## 2024-09-06 RX ORDER — CYCLOBENZAPRINE HCL 10 MG
5 TABLET ORAL
Status: DISCONTINUED | OUTPATIENT
Start: 2024-09-06 | End: 2024-09-08 | Stop reason: HOSPADM

## 2024-09-06 RX ORDER — HYDROMORPHONE HYDROCHLORIDE 1 MG/ML
0.4 INJECTION, SOLUTION INTRAMUSCULAR; INTRAVENOUS; SUBCUTANEOUS EVERY 4 HOURS PRN
Status: DISCONTINUED | OUTPATIENT
Start: 2024-09-06 | End: 2024-09-07

## 2024-09-06 RX ORDER — MAGNESIUM SULFATE HEPTAHYDRATE 40 MG/ML
2 INJECTION, SOLUTION INTRAVENOUS ONCE
Status: COMPLETED | OUTPATIENT
Start: 2024-09-06 | End: 2024-09-06

## 2024-09-06 RX ORDER — BISACODYL 10 MG/1
10 SUPPOSITORY RECTAL DAILY PRN
Status: DISCONTINUED | OUTPATIENT
Start: 2024-09-06 | End: 2024-09-07

## 2024-09-06 RX ORDER — HYDROMORPHONE HYDROCHLORIDE 2 MG/1
4 TABLET ORAL EVERY 4 HOURS PRN
Status: DISCONTINUED | OUTPATIENT
Start: 2024-09-06 | End: 2024-09-08 | Stop reason: HOSPADM

## 2024-09-06 RX ADMIN — SENNOSIDES AND DOCUSATE SODIUM 2 TABLET: 50; 8.6 TABLET ORAL at 20:41

## 2024-09-06 RX ADMIN — HYDROMORPHONE HYDROCHLORIDE 4 MG: 2 TABLET ORAL at 10:10

## 2024-09-06 RX ADMIN — ACETAMINOPHEN 975 MG: 325 TABLET ORAL at 16:25

## 2024-09-06 RX ADMIN — ACETAMINOPHEN 975 MG: 325 TABLET ORAL at 13:17

## 2024-09-06 RX ADMIN — HYDROMORPHONE HYDROCHLORIDE 4 MG: 2 TABLET ORAL at 03:43

## 2024-09-06 RX ADMIN — HYDROMORPHONE HYDROCHLORIDE 0.4 MG: 1 INJECTION, SOLUTION INTRAMUSCULAR; INTRAVENOUS; SUBCUTANEOUS at 07:20

## 2024-09-06 RX ADMIN — POLYETHYLENE GLYCOL 3350 17 G: 17 POWDER, FOR SOLUTION ORAL at 08:41

## 2024-09-06 RX ADMIN — POTASSIUM CHLORIDE 40 MEQ: 1.5 POWDER, FOR SOLUTION ORAL at 13:17

## 2024-09-06 RX ADMIN — Medication 25 MG: at 08:42

## 2024-09-06 RX ADMIN — HYDROMORPHONE HYDROCHLORIDE 4 MG: 2 TABLET ORAL at 13:17

## 2024-09-06 RX ADMIN — ACETAMINOPHEN 975 MG: 325 TABLET ORAL at 20:41

## 2024-09-06 RX ADMIN — CYCLOBENZAPRINE 5 MG: 10 TABLET, FILM COATED ORAL at 13:17

## 2024-09-06 RX ADMIN — ENOXAPARIN SODIUM 30 MG: 100 INJECTION SUBCUTANEOUS at 20:40

## 2024-09-06 RX ADMIN — CYCLOBENZAPRINE 10 MG: 10 TABLET, FILM COATED ORAL at 20:41

## 2024-09-06 RX ADMIN — HYDROMORPHONE HYDROCHLORIDE 4 MG: 2 TABLET ORAL at 00:35

## 2024-09-06 RX ADMIN — HYDROMORPHONE HYDROCHLORIDE 0.4 MG: 1 INJECTION, SOLUTION INTRAMUSCULAR; INTRAVENOUS; SUBCUTANEOUS at 05:14

## 2024-09-06 RX ADMIN — HYDROMORPHONE HYDROCHLORIDE 0.4 MG: 1 INJECTION, SOLUTION INTRAMUSCULAR; INTRAVENOUS; SUBCUTANEOUS at 03:00

## 2024-09-06 RX ADMIN — CYCLOBENZAPRINE 5 MG: 10 TABLET, FILM COATED ORAL at 08:41

## 2024-09-06 RX ADMIN — HYDROMORPHONE HYDROCHLORIDE 0.4 MG: 1 INJECTION, SOLUTION INTRAMUSCULAR; INTRAVENOUS; SUBCUTANEOUS at 11:26

## 2024-09-06 RX ADMIN — PRENATAL VIT W/ FE FUMARATE-FA TAB 27-0.8 MG 1 TABLET: 27-0.8 TAB at 08:42

## 2024-09-06 RX ADMIN — Medication 10 MG: at 20:41

## 2024-09-06 RX ADMIN — MAGNESIUM SULFATE HEPTAHYDRATE 2 G: 40 INJECTION, SOLUTION INTRAVENOUS at 06:49

## 2024-09-06 RX ADMIN — POLYETHYLENE GLYCOL 3350 17 G: 17 POWDER, FOR SOLUTION ORAL at 20:40

## 2024-09-06 RX ADMIN — HYDROMORPHONE HYDROCHLORIDE 4 MG: 2 TABLET ORAL at 16:25

## 2024-09-06 RX ADMIN — ONDANSETRON 4 MG: 2 INJECTION INTRAMUSCULAR; INTRAVENOUS at 21:52

## 2024-09-06 RX ADMIN — HYDROMORPHONE HYDROCHLORIDE 2 MG: 2 TABLET ORAL at 20:41

## 2024-09-06 RX ADMIN — HYDROMORPHONE HYDROCHLORIDE 0.4 MG: 1 INJECTION, SOLUTION INTRAMUSCULAR; INTRAVENOUS; SUBCUTANEOUS at 09:24

## 2024-09-06 RX ADMIN — HYDROMORPHONE HYDROCHLORIDE 4 MG: 2 TABLET ORAL at 06:48

## 2024-09-06 RX ADMIN — PANTOPRAZOLE SODIUM 40 MG: 40 TABLET, DELAYED RELEASE ORAL at 06:48

## 2024-09-06 RX ADMIN — LIDOCAINE 2 PATCH: 4 PATCH TOPICAL at 08:42

## 2024-09-06 RX ADMIN — ACETAMINOPHEN 975 MG: 325 TABLET ORAL at 06:48

## 2024-09-06 ASSESSMENT — COGNITIVE AND FUNCTIONAL STATUS - GENERAL
MOBILITY SCORE: 14
WALKING IN HOSPITAL ROOM: A LOT
MOVING TO AND FROM BED TO CHAIR: A LOT
TURNING FROM BACK TO SIDE WHILE IN FLAT BAD: A LITTLE
MOVING FROM LYING ON BACK TO SITTING ON SIDE OF FLAT BED WITH BEDRAILS: A LITTLE
DRESSING REGULAR UPPER BODY CLOTHING: A LITTLE
DRESSING REGULAR LOWER BODY CLOTHING: A LOT
CLIMB 3 TO 5 STEPS WITH RAILING: TOTAL
TOILETING: A LOT
HELP NEEDED FOR BATHING: A LOT
MOBILITY SCORE: 13
MOVING TO AND FROM BED TO CHAIR: A LITTLE
HELP NEEDED FOR BATHING: A LOT
STANDING UP FROM CHAIR USING ARMS: A LOT
DAILY ACTIVITIY SCORE: 17
TOILETING: A LOT
DAILY ACTIVITIY SCORE: 17
DAILY ACTIVITIY SCORE: 17
STANDING UP FROM CHAIR USING ARMS: A LOT
WALKING IN HOSPITAL ROOM: A LOT
PERSONAL GROOMING: A LITTLE
WALKING IN HOSPITAL ROOM: A LOT
MOBILITY SCORE: 13
MOVING TO AND FROM BED TO CHAIR: A LOT
STANDING UP FROM CHAIR USING ARMS: A LITTLE
DRESSING REGULAR UPPER BODY CLOTHING: A LITTLE
MOVING FROM LYING ON BACK TO SITTING ON SIDE OF FLAT BED WITH BEDRAILS: A LITTLE
CLIMB 3 TO 5 STEPS WITH RAILING: TOTAL
TURNING FROM BACK TO SIDE WHILE IN FLAT BAD: A LOT
TOILETING: A LOT
CLIMB 3 TO 5 STEPS WITH RAILING: TOTAL
DRESSING REGULAR LOWER BODY CLOTHING: A LOT
DRESSING REGULAR LOWER BODY CLOTHING: A LOT
HELP NEEDED FOR BATHING: A LOT
TURNING FROM BACK TO SIDE WHILE IN FLAT BAD: A LITTLE
MOVING FROM LYING ON BACK TO SITTING ON SIDE OF FLAT BED WITH BEDRAILS: A LITTLE

## 2024-09-06 ASSESSMENT — PAIN SCALES - GENERAL
PAINLEVEL_OUTOF10: 8
PAINLEVEL_OUTOF10: 4
PAINLEVEL_OUTOF10: 8
PAINLEVEL_OUTOF10: 9
PAINLEVEL_OUTOF10: 7
PAINLEVEL_OUTOF10: 8
PAINLEVEL_OUTOF10: 8

## 2024-09-06 ASSESSMENT — PAIN - FUNCTIONAL ASSESSMENT
PAIN_FUNCTIONAL_ASSESSMENT: 0-10

## 2024-09-06 ASSESSMENT — PAIN DESCRIPTION - ORIENTATION: ORIENTATION: RIGHT;LEFT

## 2024-09-06 ASSESSMENT — ACTIVITIES OF DAILY LIVING (ADL): HOME_MANAGEMENT_TIME_ENTRY: 23

## 2024-09-06 NOTE — PROGRESS NOTES
Physical Therapy    Physical Therapy Treatment    Patient Name: Rosa Saba  MRN: [id number]  Today's Date: 9/6/2024  Time Calculation  Start Time: 1634  Stop Time: 1700  Time Calculation (min): 26 min         Assessment/Plan   PT Assessment  End of Session Communication: Bedside nurse  Assessment Comment: .  End of Session Patient Position: Up in chair, Alarm off, caregiver present  PT Plan  Inpatient/Swing Bed or Outpatient: Inpatient  PT Plan  Treatment/Interventions: Bed mobility, Transfer training, Gait training, Balance training, Neuromuscular re-education, Endurance training, Therapeutic exercise, Therapeutic activity, Positioning, Postural re-education  PT Plan: Ongoing PT  PT Frequency: Daily  PT Discharge Recommendations: High intensity level of continued care  Equipment Recommended upon Discharge: Other (comment) (TBD at rehab (likely rental  with reclining back, elevating legrests, bedside commode, possibly a wheeled walker, [facility] bed))  PT Recommended Transfer Status: Assist x1, Assistive device (with WW min A, FFWB Right LE and WBAT Left LE)  PT - OK to Discharge: Yes (PT eval completed & DC recs made)      General Visit Information:   PT  Visit  PT Received On: 09/06/24  General  Prior to Session Communication: Bedside nurse  Patient Position Received: Bed, 3 rail up, Alarm off, caregiver present  General Comment: pt supine upon entering room. pt able to ambulate 2x20' using wheeled walker maintaining precaution. pt demosntrates increased independence with bed mobility now transferring sit to supine with CGA while HOB is elevated and use of bed rails    Subjective   Precautions:  Precautions  LE Weight Bearing Status:  (FFWB RLE, WBAT LLE)  Medical Precautions: Fall precautions, Spinal precautions    Vital Signs (Past 2hrs)                 Objective   Pain:  Pain Assessment  Pain Assessment: 0-10  0-10 (Numeric) Pain Score: 4  Pain Interventions: Ambulation/increased  activity  Cognition:  Cognition  Orientation Level: Oriented X4    Treatments:       Bed Mobility 1  Bed Mobility 1: Supine to sitting  Level of Assistance 1: Minimum assistance, Minimal verbal cues, Minimal tactile cues  Bed Mobility Comments 1: HOB elevated ~45 degrees, cues for sequencing of LEs  Bed Mobility 2  Bed Mobility  2: Sitting to supine  Level of Assistance 2: Contact guard  Bed Mobility Comments 2: cues to avoid trunk flexion or twisting during transfers, pt unable to complete tradiitonal log roll secondary to pain.    Ambulation/Gait Training 1  Surface 1: Level tile  Device 1: Rolling walker  Assistance 1: Minimum assistance, Minimal verbal cues, Minimal tactile cues  Quality of Gait 1: Narrow base of support  Transfer 1  Technique 1: Sit to stand, Stand to sit  Transfer Device 1: Walker  Trials/Comments 1: Cues for hand placement and to maintain WBing during transfers         Outcome Measures:  [facility] Basic Mobility  Turning from your back to your side while in a flat bed without using bedrails: A little  Moving from lying on your back to sitting on the side of a flat bed without using bedrails: A lot  Moving to and from bed to chair (including a wheelchair): A little  Standing up from a chair using your arms (e.g. wheelchair or bedside chair): A little  To walk in hospital room: A lot  Climbing 3-5 steps with railing: Total  Basic Mobility - Total Score: 14    Education Documentation  Home Exercise Program, taught by Rufino Kim PTA at 9/6/2024  6:23 PM.  Learner: Patient  Readiness: Acceptance  Method: Explanation  Response: Verbalizes Understanding    Education Comments  No comments found.        OP EDUCATION:       Encounter Problems       Encounter Problems (Active)       HEP       pt and /family independent with HEP (no SLR, no hip abd, spine precautions) and positioning (Progressing)       Start:  09/02/24    Expected End:  09/16/24               Mobility       sit EOB with UE  support >7 minutes with supervision, pain <2 (Progressing)       Start:  09/02/24    Expected End:  09/16/24            static stand with WW, FFWB Right LE and WBAT Left LE >5 min with SBA (Progressing)       Start:  09/02/24    Expected End:  09/16/24               Mobility       ambulate 150' with WW, FFWB Right LE, WBAT Left LE (Progressing)       Start:  09/02/24    Expected End:  09/16/24               Pain - Adult          general goals       AROM bilateral knee flexion> 75 degrees supine painfree; strength: independent LAQ painfree (Progressing)       Start:  09/02/24    Expected End:  09/16/24            supine to/from sit (HOB elevated, use of rail with SBA from Right side of bed (Progressing)       Start:  09/02/24    Expected End:  09/16/24            sit to/from stand, bed to/from chair with WW, FFWB Right LE, WBAT Left LE with SBA (Progressing)       Start:  09/02/24    Expected End:  09/16/24

## 2024-09-06 NOTE — CARE PLAN
Problem: Fall/Injury  Goal: Not fall by end of shift  Outcome: Progressing  Goal: Be free from injury by end of the shift  Outcome: Progressing  Goal: Verbalize understanding of personal risk factors for fall in the hospital  Outcome: Progressing  Goal: Verbalize understanding of risk factor reduction measures to prevent injury from fall in the home  Outcome: Progressing  Goal: Use assistive devices by end of the shift  Outcome: Progressing  Goal: Pace activities to prevent fatigue by end of the shift  Outcome: Progressing     Problem: Pain  Goal: Takes deep breaths with improved pain control throughout the shift  Outcome: Progressing  Goal: Turns in bed with improved pain control throughout the shift  Outcome: Progressing  Goal: Walks with improved pain control throughout the shift  Outcome: Progressing  Goal: Performs ADL's with improved pain control throughout shift  Outcome: Progressing  Goal: Participates in PT with improved pain control throughout the shift  Outcome: Progressing  Goal: Free from opioid side effects throughout the shift  Outcome: Progressing  Goal: Free from acute confusion related to pain meds throughout the shift  Outcome: Progressing     Problem: Pain - Adult  Goal: Verbalizes/displays adequate comfort level or baseline comfort level  Outcome: Progressing     Problem: Safety - Adult  Goal: Free from fall injury  Outcome: Progressing     Problem: Discharge Planning  Goal: Discharge to home or other facility with appropriate resources  Outcome: Progressing     Problem: Chronic Conditions and Co-morbidities  Goal: Patient's chronic conditions and co-morbidity symptoms are monitored and maintained or improved  Outcome: Progressing   The patient's goals for the shift include Pain controlled to a level of 4-6 by the end of the shift    The clinical goals for the shift include patient will have adequate pain control this shift
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
## 2024-09-07 PROCEDURE — 2500000004 HC RX 250 GENERAL PHARMACY W/ HCPCS (ALT 636 FOR OP/ED)

## 2024-09-07 PROCEDURE — 2500000005 HC RX 250 GENERAL PHARMACY W/O HCPCS

## 2024-09-07 PROCEDURE — 2500000001 HC RX 250 WO HCPCS SELF ADMINISTERED DRUGS (ALT 637 FOR MEDICARE OP): Performed by: NURSE PRACTITIONER

## 2024-09-07 PROCEDURE — 2500000001 HC RX 250 WO HCPCS SELF ADMINISTERED DRUGS (ALT 637 FOR MEDICARE OP)

## 2024-09-07 PROCEDURE — 1100000001 HC PRIVATE ROOM DAILY

## 2024-09-07 PROCEDURE — 97530 THERAPEUTIC ACTIVITIES: CPT | Mod: GP,CQ

## 2024-09-07 PROCEDURE — 97116 GAIT TRAINING THERAPY: CPT | Mod: GP,CQ

## 2024-09-07 RX ORDER — BISACODYL 10 MG/1
10 SUPPOSITORY RECTAL DAILY
Status: DISCONTINUED | OUTPATIENT
Start: 2024-09-07 | End: 2024-09-08 | Stop reason: HOSPADM

## 2024-09-07 RX ADMIN — LIDOCAINE 2 PATCH: 4 PATCH TOPICAL at 08:59

## 2024-09-07 RX ADMIN — Medication 25 MG: at 08:58

## 2024-09-07 RX ADMIN — HYDROMORPHONE HYDROCHLORIDE 2 MG: 2 TABLET ORAL at 21:58

## 2024-09-07 RX ADMIN — HYDROMORPHONE HYDROCHLORIDE 4 MG: 2 TABLET ORAL at 18:23

## 2024-09-07 RX ADMIN — ENOXAPARIN SODIUM 30 MG: 100 INJECTION SUBCUTANEOUS at 21:52

## 2024-09-07 RX ADMIN — ACETAMINOPHEN 975 MG: 325 TABLET ORAL at 21:53

## 2024-09-07 RX ADMIN — PANTOPRAZOLE SODIUM 40 MG: 40 TABLET, DELAYED RELEASE ORAL at 07:16

## 2024-09-07 RX ADMIN — ONDANSETRON 4 MG: 2 INJECTION INTRAMUSCULAR; INTRAVENOUS at 21:58

## 2024-09-07 RX ADMIN — ACETAMINOPHEN 975 MG: 325 TABLET ORAL at 13:11

## 2024-09-07 RX ADMIN — CYCLOBENZAPRINE 5 MG: 10 TABLET, FILM COATED ORAL at 13:11

## 2024-09-07 RX ADMIN — HYDROMORPHONE HYDROCHLORIDE 4 MG: 2 TABLET ORAL at 01:18

## 2024-09-07 RX ADMIN — CYCLOBENZAPRINE 10 MG: 10 TABLET, FILM COATED ORAL at 21:58

## 2024-09-07 RX ADMIN — Medication 10 MG: at 21:54

## 2024-09-07 RX ADMIN — POLYETHYLENE GLYCOL 3350 17 G: 17 POWDER, FOR SOLUTION ORAL at 08:59

## 2024-09-07 RX ADMIN — SENNOSIDES AND DOCUSATE SODIUM 2 TABLET: 50; 8.6 TABLET ORAL at 21:53

## 2024-09-07 RX ADMIN — ACETAMINOPHEN 975 MG: 325 TABLET ORAL at 07:16

## 2024-09-07 RX ADMIN — HYDROMORPHONE HYDROCHLORIDE 4 MG: 2 TABLET ORAL at 09:10

## 2024-09-07 RX ADMIN — POLYETHYLENE GLYCOL 3350 17 G: 17 POWDER, FOR SOLUTION ORAL at 21:53

## 2024-09-07 RX ADMIN — HYDROMORPHONE HYDROCHLORIDE 2 MG: 2 TABLET ORAL at 13:11

## 2024-09-07 RX ADMIN — ACETAMINOPHEN 975 MG: 325 TABLET ORAL at 17:59

## 2024-09-07 RX ADMIN — ENOXAPARIN SODIUM 30 MG: 100 INJECTION SUBCUTANEOUS at 08:59

## 2024-09-07 RX ADMIN — PRENATAL VIT W/ FE FUMARATE-FA TAB 27-0.8 MG 1 TABLET: 27-0.8 TAB at 08:59

## 2024-09-07 RX ADMIN — CYCLOBENZAPRINE 5 MG: 10 TABLET, FILM COATED ORAL at 08:59

## 2024-09-07 RX ADMIN — BISACODYL 10 MG: 10 SUPPOSITORY RECTAL at 17:59

## 2024-09-07 ASSESSMENT — COGNITIVE AND FUNCTIONAL STATUS - GENERAL
CLIMB 3 TO 5 STEPS WITH RAILING: TOTAL
WALKING IN HOSPITAL ROOM: A LOT
DRESSING REGULAR UPPER BODY CLOTHING: A LITTLE
MOBILITY SCORE: 15
DRESSING REGULAR LOWER BODY CLOTHING: A LOT
TURNING FROM BACK TO SIDE WHILE IN FLAT BAD: A LITTLE
HELP NEEDED FOR BATHING: A LOT
MOBILITY SCORE: 13
CLIMB 3 TO 5 STEPS WITH RAILING: TOTAL
MOVING TO AND FROM BED TO CHAIR: A LOT
TURNING FROM BACK TO SIDE WHILE IN FLAT BAD: A LOT
WALKING IN HOSPITAL ROOM: A LITTLE
MOVING FROM LYING ON BACK TO SITTING ON SIDE OF FLAT BED WITH BEDRAILS: A LITTLE
TOILETING: A LOT
DAILY ACTIVITIY SCORE: 17
STANDING UP FROM CHAIR USING ARMS: A LOT
MOVING FROM LYING ON BACK TO SITTING ON SIDE OF FLAT BED WITH BEDRAILS: A LITTLE
STANDING UP FROM CHAIR USING ARMS: A LITTLE
MOVING TO AND FROM BED TO CHAIR: A LITTLE

## 2024-09-07 ASSESSMENT — PAIN SCALES - GENERAL
PAINLEVEL_OUTOF10: 8
PAINLEVEL_OUTOF10: 7
PAINLEVEL_OUTOF10: 8
PAINLEVEL_OUTOF10: 7
PAINLEVEL_OUTOF10: 9
PAINLEVEL_OUTOF10: 6
PAINLEVEL_OUTOF10: 7
PAINLEVEL_OUTOF10: 8
PAINLEVEL_OUTOF10: 6
PAINLEVEL_OUTOF10: 5 - MODERATE PAIN
PAINLEVEL_OUTOF10: 8

## 2024-09-07 ASSESSMENT — PAIN DESCRIPTION - LOCATION
LOCATION: HIP
LOCATION: ABDOMEN
LOCATION: HIP
LOCATION: HIP

## 2024-09-07 ASSESSMENT — PAIN DESCRIPTION - ORIENTATION
ORIENTATION: LEFT;RIGHT
ORIENTATION: RIGHT;LEFT

## 2024-09-07 ASSESSMENT — PAIN DESCRIPTION - DESCRIPTORS: DESCRIPTORS: ACHING

## 2024-09-07 ASSESSMENT — PAIN - FUNCTIONAL ASSESSMENT
PAIN_FUNCTIONAL_ASSESSMENT: 0-10
PAIN_FUNCTIONAL_ASSESSMENT: 0-10

## 2024-09-07 ASSESSMENT — PAIN SCALES - PAIN ASSESSMENT IN ADVANCED DEMENTIA (PAINAD): TOTALSCORE: MEDICATION (SEE MAR)

## 2024-09-07 NOTE — CARE PLAN
The patient's goals for the shift include Pain controlled to a level of 4-6 by the end of the shift    The clinical goals for the shift include patient will be safe

## 2024-09-07 NOTE — PROGRESS NOTES
Social Work Note:  Per Team: Patient is medically ready for discharge. SW messaged [facility] thru [facility], reported “no bed availability” on this date, possibly tomorrow, SW will check with [facility] [place] tomorrow morning, will keep all updated.  Clara Funes MSW/SHANTANU ([id number])

## 2024-09-07 NOTE — PROGRESS NOTES
Child Life Assessment:   Reason for Consult  Discipline: Child Life Specialist  Reason for Consult: Coping skill development/planning, Relaxation strategies  Referral Source: Ongoing  Total Time Spent (min): 20 minutes    Patient Intervention(s)  Type of Intervention Performed: Procedural support interventions, Healing environment interventions  Healing Environment Intervention(s): Assessment, Opportunity for choice and control, Empathetic listening/validation of emotions, Coping plan implementation, Coping skill development/planning (support for connecting with daughter while hospitalized)  Procedural Support Intervention(s): Coping plan implementation, Relaxation/guided imagery strategies    Support Provided to Family  Support Provided to Family: Family present for patient session  Family Present for Patient Session: Other(s)  Other(s) Relationship: fiance  Family Participation: Supportive    Evaluation  Evaluation/Plan of Care: Patient/family receptive    Session Details: Certified Child Life Specialist (CCLS) introduced self to patient and patient's fiance, who are familiar with child life services.  Patient and CCLS discussed patient's experience throughout the last several days, how patient juan carlos with stress and pain, and how patient has tried to reframe her thinking with pain.  Patient shared that she has utilized grounding techniques previously and is attempting to move away from stronger pain medications.  When RN entered the room to change gauze around orthopedic hardware, CCLS offered to lead patient through a progressive muscle relaxation (PMR) script as a means of coping with pain and anxiety surrounding the procedure.  Patient followed along with CCLS, using slow deep breaths and tensing and relaxing muscles in her arms, hands, and face throughout the gauze removal.  Patient shared that she found this to be helpful and is open to trying other tools on a resource list previous CCLS provided as patient  heals.  CCLS, patient, and patient's fiance discussed how 4-year-old daughter was coping with patient's admission.  CCLS provided ideas for maintaining a routine and sense of feeling connected while apart.  Bears provided for both patient and daughter as a means of remaining connected.  Patient and fiance expressed their appreciation and denied additional needs at this time.  Child life will continue to follow as needed throughout admission.    NOHEMY Toussaint, CCLS  Secure Chat

## 2024-09-07 NOTE — CARE PLAN
The clinical goals for the shift include patients pain will remain well managed through end of shift      Problem: Fall/Injury  Goal: Not fall by end of shift  Outcome: Progressing  Goal: Be free from injury by end of the shift  Outcome: Progressing  Goal: Verbalize understanding of personal risk factors for fall in the hospital  Outcome: Progressing  Goal: Verbalize understanding of risk factor reduction measures to prevent injury from fall in the home  Outcome: Progressing  Goal: Use assistive devices by end of the shift  Outcome: Progressing  Goal: Pace activities to prevent fatigue by end of the shift  Outcome: Progressing     Problem: Pain  Goal: Takes deep breaths with improved pain control throughout the shift  Outcome: Progressing  Goal: Turns in bed with improved pain control throughout the shift  Outcome: Progressing  Goal: Walks with improved pain control throughout the shift  Outcome: Progressing  Goal: Performs ADL's with improved pain control throughout shift  Outcome: Progressing  Goal: Participates in PT with improved pain control throughout the shift  Outcome: Progressing  Goal: Free from opioid side effects throughout the shift  Outcome: Progressing  Goal: Free from acute confusion related to pain meds throughout the shift  Outcome: Progressing     Problem: Pain - Adult  Goal: Verbalizes/displays adequate comfort level or baseline comfort level  Outcome: Progressing     Problem: Safety - Adult  Goal: Free from fall injury  Outcome: Progressing     Problem: Discharge Planning  Goal: Discharge to home or other facility with appropriate resources  Outcome: Progressing     Problem: Chronic Conditions and Co-morbidities  Goal: Patient's chronic conditions and co-morbidity symptoms are monitored and maintained or improved  Outcome: Progressing
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
## 2024-09-07 NOTE — CARE PLAN
The patient's goals for the shift include Pain controlled to a level of 4-6 by the end of the shift    The clinical goals for the shift include patients pain will remain well managed through end of shift

## 2024-09-08 VITALS
TEMPERATURE: 96.8 F | HEIGHT: 63 IN | OXYGEN SATURATION: 99 % | BODY MASS INDEX: 19.14 KG/M2 | WEIGHT: 108 LBS | HEART RATE: 96 BPM | DIASTOLIC BLOOD PRESSURE: 73 MMHG | RESPIRATION RATE: 16 BRPM | SYSTOLIC BLOOD PRESSURE: 120 MMHG

## 2024-09-08 PROCEDURE — 97530 THERAPEUTIC ACTIVITIES: CPT | Mod: GP,CQ

## 2024-09-08 PROCEDURE — 2500000004 HC RX 250 GENERAL PHARMACY W/ HCPCS (ALT 636 FOR OP/ED)

## 2024-09-08 PROCEDURE — 2500000005 HC RX 250 GENERAL PHARMACY W/O HCPCS

## 2024-09-08 PROCEDURE — 99238 HOSP IP/OBS DSCHRG MGMT 30/<: CPT | Performed by: SURGERY

## 2024-09-08 PROCEDURE — 2500000001 HC RX 250 WO HCPCS SELF ADMINISTERED DRUGS (ALT 637 FOR MEDICARE OP)

## 2024-09-08 RX ORDER — SCOLOPAMINE TRANSDERMAL SYSTEM 1 MG/1
1 PATCH, EXTENDED RELEASE TRANSDERMAL
Start: 2024-09-08

## 2024-09-08 RX ORDER — PYRIDOXINE HCL (VITAMIN B6) 25 MG
25 TABLET ORAL DAILY
Start: 2024-09-09

## 2024-09-08 RX ORDER — ACETAMINOPHEN 325 MG/1
975 TABLET ORAL 4 TIMES DAILY
Start: 2024-09-08

## 2024-09-08 RX ORDER — CYCLOBENZAPRINE HCL 10 MG
10 TABLET ORAL NIGHTLY
Start: 2024-09-08

## 2024-09-08 RX ORDER — ENOXAPARIN SODIUM 100 MG/ML
30 INJECTION SUBCUTANEOUS EVERY 12 HOURS
Start: 2024-09-08

## 2024-09-08 RX ORDER — ONDANSETRON 4 MG/1
4 TABLET, FILM COATED ORAL EVERY 8 HOURS PRN
Start: 2024-09-08

## 2024-09-08 RX ORDER — PANTOPRAZOLE SODIUM 40 MG/1
40 TABLET, DELAYED RELEASE ORAL
Start: 2024-09-09

## 2024-09-08 RX ORDER — HYDROMORPHONE HYDROCHLORIDE 4 MG/1
4 TABLET ORAL EVERY 4 HOURS PRN
Qty: 10 TABLET | Refills: 0 | Status: SHIPPED | OUTPATIENT
Start: 2024-09-08

## 2024-09-08 RX ORDER — HYDROMORPHONE HYDROCHLORIDE 4 MG/1
4 TABLET ORAL EVERY 4 HOURS PRN
Qty: 10 TABLET | Refills: 0 | Status: CANCELLED | OUTPATIENT
Start: 2024-09-08

## 2024-09-08 RX ORDER — HYDROMORPHONE HYDROCHLORIDE 2 MG/1
2 TABLET ORAL EVERY 4 HOURS PRN
Qty: 10 TABLET | Refills: 0 | Status: CANCELLED | OUTPATIENT
Start: 2024-09-08

## 2024-09-08 RX ORDER — ACETAMINOPHEN, DIPHENHYDRAMINE HCL, PHENYLEPHRINE HCL 325; 25; 5 MG/1; MG/1; MG/1
10 TABLET ORAL NIGHTLY
Start: 2024-09-08

## 2024-09-08 RX ORDER — CYCLOBENZAPRINE HCL 5 MG
5 TABLET ORAL
Start: 2024-09-08

## 2024-09-08 RX ORDER — POLYETHYLENE GLYCOL 3350 17 G/17G
17 POWDER, FOR SOLUTION ORAL 2 TIMES DAILY
Start: 2024-09-08

## 2024-09-08 RX ORDER — HYDROMORPHONE HYDROCHLORIDE 4 MG/1
4 TABLET ORAL EVERY 4 HOURS PRN
Qty: 10 TABLET | Refills: 0 | Status: SHIPPED | OUTPATIENT
Start: 2024-09-08 | End: 2024-09-08

## 2024-09-08 RX ORDER — BISACODYL 10 MG/1
10 SUPPOSITORY RECTAL DAILY
Start: 2024-09-09

## 2024-09-08 RX ORDER — LIDOCAINE 560 MG/1
2 PATCH PERCUTANEOUS; TOPICAL; TRANSDERMAL DAILY
Qty: 10 PATCH | Refills: 0 | Status: SHIPPED | OUTPATIENT
Start: 2024-09-09

## 2024-09-08 RX ORDER — AMOXICILLIN 250 MG
2 CAPSULE ORAL NIGHTLY
Start: 2024-09-08

## 2024-09-08 RX ORDER — HYDROMORPHONE HYDROCHLORIDE 2 MG/1
2 TABLET ORAL EVERY 4 HOURS PRN
Qty: 10 TABLET | Refills: 0 | Status: SHIPPED | OUTPATIENT
Start: 2024-09-08

## 2024-09-08 RX ADMIN — LIDOCAINE 2 PATCH: 4 PATCH TOPICAL at 08:49

## 2024-09-08 RX ADMIN — ACETAMINOPHEN 975 MG: 325 TABLET ORAL at 17:02

## 2024-09-08 RX ADMIN — HYDROMORPHONE HYDROCHLORIDE 4 MG: 2 TABLET ORAL at 02:42

## 2024-09-08 RX ADMIN — ACETAMINOPHEN 975 MG: 325 TABLET ORAL at 06:54

## 2024-09-08 RX ADMIN — SCOPOLAMINE 1 PATCH: 1.5 PATCH, EXTENDED RELEASE TRANSDERMAL at 13:12

## 2024-09-08 RX ADMIN — PANTOPRAZOLE SODIUM 40 MG: 40 TABLET, DELAYED RELEASE ORAL at 06:54

## 2024-09-08 RX ADMIN — ENOXAPARIN SODIUM 30 MG: 100 INJECTION SUBCUTANEOUS at 08:53

## 2024-09-08 RX ADMIN — ACETAMINOPHEN 975 MG: 325 TABLET ORAL at 13:12

## 2024-09-08 RX ADMIN — CYCLOBENZAPRINE 5 MG: 10 TABLET, FILM COATED ORAL at 08:48

## 2024-09-08 RX ADMIN — PRENATAL VIT W/ FE FUMARATE-FA TAB 27-0.8 MG 1 TABLET: 27-0.8 TAB at 08:48

## 2024-09-08 RX ADMIN — Medication 25 MG: at 08:48

## 2024-09-08 RX ADMIN — CYCLOBENZAPRINE 5 MG: 10 TABLET, FILM COATED ORAL at 13:12

## 2024-09-08 ASSESSMENT — PAIN DESCRIPTION - ORIENTATION
ORIENTATION: RIGHT;LEFT
ORIENTATION: RIGHT;LEFT

## 2024-09-08 ASSESSMENT — COGNITIVE AND FUNCTIONAL STATUS - GENERAL
CLIMB 3 TO 5 STEPS WITH RAILING: A LOT
MOVING TO AND FROM BED TO CHAIR: A LITTLE
TURNING FROM BACK TO SIDE WHILE IN FLAT BAD: A LOT
MOVING FROM LYING ON BACK TO SITTING ON SIDE OF FLAT BED WITH BEDRAILS: A LITTLE
MOBILITY SCORE: 17
MOBILITY SCORE: 13
STANDING UP FROM CHAIR USING ARMS: A LITTLE
DAILY ACTIVITIY SCORE: 20
WALKING IN HOSPITAL ROOM: A LITTLE
STANDING UP FROM CHAIR USING ARMS: A LITTLE
TURNING FROM BACK TO SIDE WHILE IN FLAT BAD: A LITTLE
HELP NEEDED FOR BATHING: A LITTLE
DRESSING REGULAR LOWER BODY CLOTHING: A LOT
CLIMB 3 TO 5 STEPS WITH RAILING: TOTAL
MOVING TO AND FROM BED TO CHAIR: A LITTLE
MOVING FROM LYING ON BACK TO SITTING ON SIDE OF FLAT BED WITH BEDRAILS: A LOT
WALKING IN HOSPITAL ROOM: A LOT
TOILETING: A LITTLE

## 2024-09-08 ASSESSMENT — PAIN DESCRIPTION - LOCATION
LOCATION: HIP
LOCATION: HIP

## 2024-09-08 ASSESSMENT — PAIN SCALES - GENERAL
PAINLEVEL_OUTOF10: 6
PAINLEVEL_OUTOF10: 6
PAINLEVEL_OUTOF10: 5 - MODERATE PAIN

## 2024-09-08 ASSESSMENT — PAIN SCALES - PAIN ASSESSMENT IN ADVANCED DEMENTIA (PAINAD): TOTALSCORE: MEDICATION (SEE MAR)

## 2024-09-08 ASSESSMENT — PAIN - FUNCTIONAL ASSESSMENT: PAIN_FUNCTIONAL_ASSESSMENT: 0-10

## 2024-09-08 NOTE — CARE PLAN
The clinical goals for the shift include pain management. Plan for patient to discharge this evening to [facility].    Problem: Fall/Injury  Goal: Not fall by end of shift  Outcome: Progressing  Goal: Be free from injury by end of the shift  Outcome: Progressing  Goal: Pace activities to prevent fatigue by end of the shift  Outcome: Progressing     Problem: Pain  Goal: Takes deep breaths with improved pain control throughout the shift  Outcome: Progressing  Goal: Turns in bed with improved pain control throughout the shift  Outcome: Progressing  Goal: Walks with improved pain control throughout the shift  Outcome: Progressing  Goal: Performs ADL's with improved pain control throughout shift  Outcome: Progressing  Goal: Free from opioid side effects throughout the shift  Outcome: Progressing  Goal: Free from acute confusion related to pain meds throughout the shift  Outcome: Progressing     Problem: Pain - Adult  Goal: Verbalizes/displays adequate comfort level or baseline comfort level  Outcome: Progressing     Problem: Safety - Adult  Goal: Free from fall injury  Outcome: Progressing     Problem: Discharge Planning  Goal: Discharge to home or other facility with appropriate resources  Outcome: Progressing     Problem: Chronic Conditions and Co-morbidities  Goal: Patient's chronic conditions and co-morbidity symptoms are monitored and maintained or improved  Outcome: Progressing

## 2024-09-08 NOTE — PROGRESS NOTES
Physical Therapy Treatment    Patient Name: Rosa Saba  MRN: [id number]  Today's Date: 9/8/2024  Room: [address] [place], [place] [address]  Time Calculation  Start Time: 1542  Stop Time: 1605  Time Calculation (min): 23 min       Assessment/Plan      PT Plan  Inpatient/Swing Bed or Outpatient: Inpatient  PT Plan  Treatment/Interventions: Bed mobility, Transfer training, Gait training, Balance training, Neuromuscular re-education, Endurance training, Therapeutic exercise, Therapeutic activity, Positioning, Postural re-education  PT Plan: Ongoing PT  PT Frequency: Daily  PT Discharge Recommendations: High intensity level of continued care  Equipment Recommended upon Discharge: Other (comment) (TBD at rehab (likely rental WC with reclining back, elevating legrests, bedside commode, possibly a wheeled walker, [facility] bed))  PT Recommended Transfer Status: Assist x1, Assistive device (with WW min A, FFWB Right LE and WBAT Left LE)  PT - OK to Discharge: Yes (PT eval completed & DC recs made)  Assessment: Patient is progressing Well with therapy this date. Completed pt education with pt/family, see notes below. Would continue to benefit from continued skilled PT to address all mobility deficits; remains appropriate for HIGH intensity therapy when medically appropriate for discharge from acute stay.  Will continue to follow.     General Visit Information:   PT  Visit  PT Received On: 09/08/24  Prior to Session Communication: Bedside nurse  Patient Position Received: Bed, 3 rail up, Alarm off, caregiver present  Family/Caregiver Present: Yes  Caregiver Feedback: SO/father/sister present     Subjective   Subjective: Pt pleasant and agreeable to therapy upon approach    Precautions:  Precautions  LE Weight Bearing Status:  (Right LE footflat WB (Dr. Lucero confirmed) and Left LE WBAT)  Medical Precautions: Fall precautions, Spinal precautions  Precautions Comment: pelvic exfix      Objective   Pain:  Pain Assessment  Pain Assessment:  0-10  0-10 (Numeric) Pain Score: 6  Pain Type: Chronic pain  Pain Location: Hip  Cognition:  Cognition  Overall Cognitive Status: Within Functional Limits  Orientation Level: Oriented X4      PT Treatments:     Therapeutic Activity  Therapeutic Activity 1: Pt DC to rehab in several hours, wanted to f/u with therapy with questions. Educated pt/family on AR goals, therapy etc. Discussed therapy progression from AR to HH etc. Housing layout wc accessible per father has ramp and walk in shower. Educated on different pain relieving techniques positional, ice, relaxation etc. Reviewed WB status, pt could accurately recall. Educated pt to continue completing ankle pumps/heel slides and importance of OOB mobility. Pt/family had no further questions at the end of discussion.    Outcome Measures:  [facility] Basic Mobility  Turning from your back to your side while in a flat bed without using bedrails: A lot  Moving from lying on your back to sitting on the side of a flat bed without using bedrails: A lot  Moving to and from bed to chair (including a wheelchair): A little  Standing up from a chair using your arms (e.g. wheelchair or bedside chair): A little  To walk in hospital room: A lot  Climbing 3-5 steps with railing: Total  Basic Mobility - Total Score: 13    Education Documentation  Body Mechanics, taught by Mayela Doyle PTA at 9/8/2024  4:13 PM.  Learner: Family, Patient  Readiness: Acceptance  Method: Explanation  Response: Verbalizes Understanding    Precautions, taught by Mayela Doyle PTA at 9/8/2024  4:13 PM.  Learner: Family, Patient  Readiness: Acceptance  Method: Explanation  Response: Verbalizes Understanding    ADL Training, taught by Mayela Doyle PTA at 9/8/2024  4:13 PM.  Learner: Family, Patient  Readiness: Acceptance  Method: Explanation  Response: Verbalizes Understanding    Handouts, taught by Mayela Doyle PTA at 9/8/2024  4:13 PM.  Learner: Family, Patient  Readiness: Acceptance  Method:  Explanation  Response: Verbalizes Understanding    Precautions, taught by Mayela Doyle PTA at 9/8/2024  4:13 PM.  Learner: Family, Patient  Readiness: Acceptance  Method: Explanation  Response: Verbalizes Understanding    Body Mechanics, taught by Mayela Doyle PTA at 9/8/2024  4:13 PM.  Learner: Family, Patient  Readiness: Acceptance  Method: Explanation  Response: Verbalizes Understanding    Home Exercise Program, taught by Mayela Doyle PTA at 9/8/2024  4:13 PM.  Learner: Family, Patient  Readiness: Acceptance  Method: Explanation  Response: Verbalizes Understanding    Mobility Training, taught by Mayela Doyle PTA at 9/8/2024  4:13 PM.  Learner: Family, Patient  Readiness: Acceptance  Method: Explanation  Response: Verbalizes Understanding    Education Comments  No comments found.          OP EDUCATION:       Encounter Problems       Encounter Problems (Active)       HEP       pt and /family independent with HEP (no SLR, no hip abd, spine precautions) and positioning (Progressing)       Start:  09/02/24    Expected End:  09/16/24               Mobility       sit EOB with UE support >7 minutes with supervision, pain <2 (Progressing)       Start:  09/02/24    Expected End:  09/16/24            static stand with WW, FFWB Right LE and WBAT Left LE >5 min with SBA (Progressing)       Start:  09/02/24    Expected End:  09/16/24               Mobility       ambulate 150' with WW, FFWB Right LE, WBAT Left LE (Progressing)       Start:  09/02/24    Expected End:  09/16/24               Pain - Adult          general goals       AROM bilateral knee flexion> 75 degrees supine painfree; strength: independent LAQ painfree (Progressing)       Start:  09/02/24    Expected End:  09/16/24            supine to/from sit (HOB elevated, use of rail with SBA from Right side of bed (Progressing)       Start:  09/02/24    Expected End:  09/16/24            sit to/from stand, bed to/from chair with WW, FFWB Right LE, WBAT Left LE  with SBA (Progressing)       Start:  09/02/24    Expected End:  09/16/24
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
## 2024-09-08 NOTE — NURSING NOTE
Nursing report called to Yazmin at [facility]. Patient was given AVS and IV removed earlier.

## 2024-09-09 LAB
BACTERIAL VAGINOSIS VAG-IMP: ABNORMAL
CLUE CELLS VAG LPF-#/AREA: ABNORMAL /[LPF]
NUGENT SCORE: 4
YEAST VAG WET PREP-#/AREA: PRESENT

## 2024-09-12 DIAGNOSIS — O36.80X0 PREGNANCY OF UNKNOWN ANATOMIC LOCATION (HHS-HCC): Primary | ICD-10-CM

## 2024-09-17 ENCOUNTER — TELEPHONE (OUTPATIENT)
Dept: PRIMARY CARE | Facility: CLINIC | Age: 24
End: 2024-09-17
Payer: MEDICAID

## 2024-09-17 DIAGNOSIS — S32.810A PELVIC RING FRACTURE, CLOSED, INITIAL ENCOUNTER (MULTI): ICD-10-CM

## 2024-09-18 ENCOUNTER — OFFICE VISIT (OUTPATIENT)
Dept: ORTHOPEDIC SURGERY | Facility: CLINIC | Age: 24
End: 2024-09-18
Payer: MEDICAID

## 2024-09-18 ENCOUNTER — TELEPHONE (OUTPATIENT)
Dept: RADIOLOGY | Facility: HOSPITAL | Age: 24
End: 2024-09-18

## 2024-09-18 ENCOUNTER — HOSPITAL ENCOUNTER (OUTPATIENT)
Dept: RADIOLOGY | Facility: CLINIC | Age: 24
Discharge: HOME | End: 2024-09-18
Payer: MEDICAID

## 2024-09-18 VITALS — WEIGHT: 108 LBS | HEIGHT: 63 IN | BODY MASS INDEX: 19.14 KG/M2

## 2024-09-18 DIAGNOSIS — S32.810A PELVIC RING FRACTURE, CLOSED, INITIAL ENCOUNTER (MULTI): ICD-10-CM

## 2024-09-18 DIAGNOSIS — S32.810A COMPLETE DISRUPTION OF PELVIC RING, CLOSED, INITIAL ENCOUNTER (MULTI): Primary | ICD-10-CM

## 2024-09-18 PROCEDURE — 72190 X-RAY EXAM OF PELVIS: CPT | Performed by: RADIOLOGY

## 2024-09-18 PROCEDURE — 1036F TOBACCO NON-USER: CPT | Performed by: STUDENT IN AN ORGANIZED HEALTH CARE EDUCATION/TRAINING PROGRAM

## 2024-09-18 PROCEDURE — 72190 X-RAY EXAM OF PELVIS: CPT

## 2024-09-18 PROCEDURE — 99211 OFF/OP EST MAY X REQ PHY/QHP: CPT | Performed by: STUDENT IN AN ORGANIZED HEALTH CARE EDUCATION/TRAINING PROGRAM

## 2024-09-18 RX ORDER — NAPROXEN SODIUM 220 MG/1
81 TABLET, FILM COATED ORAL 2 TIMES DAILY
Qty: 60 TABLET | Refills: 0 | Status: SHIPPED | OUTPATIENT
Start: 2024-10-02 | End: 2024-11-01

## 2024-09-18 ASSESSMENT — PATIENT HEALTH QUESTIONNAIRE - PHQ9
2. FEELING DOWN, DEPRESSED OR HOPELESS: NOT AT ALL
1. LITTLE INTEREST OR PLEASURE IN DOING THINGS: NOT AT ALL
SUM OF ALL RESPONSES TO PHQ9 QUESTIONS 1 AND 2: 0

## 2024-09-18 ASSESSMENT — ENCOUNTER SYMPTOMS
OCCASIONAL FEELINGS OF UNSTEADINESS: 0
DEPRESSION: 0
LOSS OF SENSATION IN FEET: 0

## 2024-09-18 ASSESSMENT — PAIN - FUNCTIONAL ASSESSMENT: PAIN_FUNCTIONAL_ASSESSMENT: 0-10

## 2024-09-18 ASSESSMENT — PAIN SCALES - GENERAL: PAINLEVEL_OUTOF10: 2
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
## 2024-09-30 ENCOUNTER — APPOINTMENT (OUTPATIENT)
Dept: PHYSICAL THERAPY | Facility: CLINIC | Age: 24
End: 2024-09-30
Payer: MEDICAID

## 2024-09-30 DIAGNOSIS — S32.9XXD CLOSED DISPLACED FRACTURE OF PELVIS WITH ROUTINE HEALING, UNSPECIFIED PART OF PELVIS, SUBSEQUENT ENCOUNTER: Primary | ICD-10-CM

## 2024-09-30 DIAGNOSIS — S32.810D MULTIPLE CLOSED FRACTURES OF PELVIS WITH STABLE DISRUPTION OF PELVIC RING WITH ROUTINE HEALING, SUBSEQUENT ENCOUNTER: ICD-10-CM

## 2024-09-30 PROCEDURE — 97110 THERAPEUTIC EXERCISES: CPT | Performed by: PHYSICAL THERAPIST

## 2024-09-30 PROCEDURE — 97162 PT EVAL MOD COMPLEX 30 MIN: CPT | Performed by: PHYSICAL THERAPIST

## 2024-09-30 ASSESSMENT — PATIENT HEALTH QUESTIONNAIRE - PHQ9
2. FEELING DOWN, DEPRESSED OR HOPELESS: MORE THAN HALF THE DAYS
6. FEELING BAD ABOUT YOURSELF - OR THAT YOU ARE A FAILURE OR HAVE LET YOURSELF OR YOUR FAMILY DOWN: MORE THAN HALF THE DAYS
9. THOUGHTS THAT YOU WOULD BE BETTER OFF DEAD, OR OF HURTING YOURSELF: NOT AT ALL
7. TROUBLE CONCENTRATING ON THINGS, SUCH AS READING THE NEWSPAPER OR WATCHING TELEVISION: NOT AT ALL
5. POOR APPETITE OR OVEREATING: NOT AT ALL
1. LITTLE INTEREST OR PLEASURE IN DOING THINGS: MORE THAN HALF THE DAYS
SUM OF ALL RESPONSES TO PHQ9 QUESTIONS 1 AND 2: 4
4. FEELING TIRED OR HAVING LITTLE ENERGY: NOT AT ALL
10. IF YOU CHECKED OFF ANY PROBLEMS, HOW DIFFICULT HAVE THESE PROBLEMS MADE IT FOR YOU TO DO YOUR WORK, TAKE CARE OF THINGS AT HOME, OR GET ALONG WITH OTHER PEOPLE: NOT DIFFICULT AT ALL
3. TROUBLE FALLING OR STAYING ASLEEP OR SLEEPING TOO MUCH: NOT AT ALL
8. MOVING OR SPEAKING SO SLOWLY THAT OTHER PEOPLE COULD HAVE NOTICED. OR THE OPPOSITE, BEING SO FIGETY OR RESTLESS THAT YOU HAVE BEEN MOVING AROUND A LOT MORE THAN USUAL: NOT AT ALL
SUM OF ALL RESPONSES TO PHQ QUESTIONS 1-9: 6

## 2024-09-30 ASSESSMENT — ENCOUNTER SYMPTOMS
OCCASIONAL FEELINGS OF UNSTEADINESS: 0
DEPRESSION: 1
LOSS OF SENSATION IN FEET: 0

## 2024-09-30 ASSESSMENT — PAIN SCALES - GENERAL: PAINLEVEL_OUTOF10: 2

## 2024-09-30 ASSESSMENT — PAIN DESCRIPTION - DESCRIPTORS: DESCRIPTORS: SHARP

## 2024-10-04 ENCOUNTER — TELEPHONE (OUTPATIENT)
Dept: ORTHOPEDIC SURGERY | Facility: HOSPITAL | Age: 24
End: 2024-10-04

## 2024-10-05 ENCOUNTER — OFFICE VISIT (OUTPATIENT)
Dept: URGENT CARE | Age: 24
End: 2024-10-05
Payer: MEDICAID

## 2024-10-05 VITALS
DIASTOLIC BLOOD PRESSURE: 73 MMHG | TEMPERATURE: 98.7 F | HEART RATE: 78 BPM | WEIGHT: 120 LBS | RESPIRATION RATE: 16 BRPM | HEIGHT: 63 IN | SYSTOLIC BLOOD PRESSURE: 108 MMHG | OXYGEN SATURATION: 98 % | BODY MASS INDEX: 21.26 KG/M2

## 2024-10-05 DIAGNOSIS — T84.7XXA: Primary | ICD-10-CM

## 2024-10-05 PROCEDURE — 87205 SMEAR GRAM STAIN: CPT

## 2024-10-05 PROCEDURE — 87070 CULTURE OTHR SPECIMN AEROBIC: CPT

## 2024-10-05 PROCEDURE — 87075 CULTR BACTERIA EXCEPT BLOOD: CPT

## 2024-10-05 RX ORDER — SULFAMETHOXAZOLE AND TRIMETHOPRIM 800; 160 MG/1; MG/1
1 TABLET ORAL 2 TIMES DAILY
Qty: 14 TABLET | Refills: 0 | Status: SHIPPED | OUTPATIENT
Start: 2024-10-05 | End: 2024-10-12

## 2024-10-05 ASSESSMENT — ENCOUNTER SYMPTOMS
RESPIRATORY NEGATIVE: 1
PALPITATIONS: 0
EYES NEGATIVE: 1
HEMATOLOGIC/LYMPHATIC NEGATIVE: 1
ENDOCRINE NEGATIVE: 1
MUSCULOSKELETAL NEGATIVE: 1
PSYCHIATRIC NEGATIVE: 1
NEUROLOGICAL NEGATIVE: 1
CONSTITUTIONAL NEGATIVE: 1
GASTROINTESTINAL NEGATIVE: 1
ALLERGIC/IMMUNOLOGIC NEGATIVE: 1

## 2024-10-05 ASSESSMENT — PATIENT HEALTH QUESTIONNAIRE - PHQ9
1. LITTLE INTEREST OR PLEASURE IN DOING THINGS: NOT AT ALL
2. FEELING DOWN, DEPRESSED OR HOPELESS: NOT AT ALL
SUM OF ALL RESPONSES TO PHQ9 QUESTIONS 1 AND 2: 0
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
## 2024-10-06 LAB
BACTERIA SPEC CULT: NORMAL
GRAM STN SPEC: NORMAL
GRAM STN SPEC: NORMAL

## 2024-10-08 LAB
BACTERIA SPEC CULT: NORMAL
GRAM STN SPEC: NORMAL
GRAM STN SPEC: NORMAL

## 2024-10-09 ENCOUNTER — APPOINTMENT (OUTPATIENT)
Dept: PHYSICAL THERAPY | Facility: CLINIC | Age: 24
End: 2024-10-09
Payer: MEDICAID

## 2024-10-09 DIAGNOSIS — S32.810D MULTIPLE OPEN FRACTURES OF PELVIS WITH STABLE DISRUPTION OF PELVIC CIRCLE WITH ROUTINE HEALING, SUBSEQUENT ENCOUNTER: Primary | ICD-10-CM

## 2024-10-09 DIAGNOSIS — S32.9XXD CLOSED DISPLACED FRACTURE OF PELVIS WITH ROUTINE HEALING, UNSPECIFIED PART OF PELVIS, SUBSEQUENT ENCOUNTER: ICD-10-CM

## 2024-10-09 PROCEDURE — 97110 THERAPEUTIC EXERCISES: CPT | Performed by: PHYSICAL THERAPIST

## 2024-10-09 ASSESSMENT — PAIN SCALES - GENERAL: PAINLEVEL_OUTOF10: 0 - NO PAIN
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
## 2024-10-14 ENCOUNTER — APPOINTMENT (OUTPATIENT)
Dept: PHYSICAL THERAPY | Facility: CLINIC | Age: 24
End: 2024-10-14
Payer: MEDICAID

## 2024-10-14 DIAGNOSIS — S32.9XXD CLOSED DISPLACED FRACTURE OF PELVIS WITH ROUTINE HEALING, UNSPECIFIED PART OF PELVIS, SUBSEQUENT ENCOUNTER: ICD-10-CM

## 2024-10-14 DIAGNOSIS — S32.810D MULTIPLE OPEN FRACTURES OF PELVIS WITH STABLE DISRUPTION OF PELVIC CIRCLE WITH ROUTINE HEALING, SUBSEQUENT ENCOUNTER: Primary | ICD-10-CM

## 2024-10-14 DIAGNOSIS — S32.810D MULTIPLE CLOSED FRACTURES OF PELVIS WITH STABLE DISRUPTION OF PELVIC RING WITH ROUTINE HEALING, SUBSEQUENT ENCOUNTER: ICD-10-CM

## 2024-10-14 PROCEDURE — 97110 THERAPEUTIC EXERCISES: CPT | Performed by: PHYSICAL THERAPIST

## 2024-10-14 ASSESSMENT — PAIN SCALES - GENERAL: PAINLEVEL_OUTOF10: 0 - NO PAIN

## 2024-10-14 NOTE — PROGRESS NOTES
"Physical Therapy    Physical Therapy Treatment    Patient Name: Rosa Saba  MRN: [id number]  Today's Date: 10/14/2024    Current Problem  1. Multiple open fractures of pelvis with stable disruption of pelvic [place] with routine healing, subsequent encounter        2. Closed displaced fracture of pelvis with routine healing, unspecified part of pelvis, subsequent encounter        3. Multiple closed fractures of pelvis with stable disruption of pelvic ring with routine healing, subsequent encounter          General  Reason for Referral: S/p pelvic fracture with ORIF  Referred By: Rancho Juarez MD    Insurance: [facility] Community Plan  Allowed visits: 2/8 by 11/8/24    Subjective  Patient with no new complaints today. She will see referring physician for a F/U later this week. She is hoping to have fixator removed. She did finish her antibiotics as prescribed.     Objective  Precautions: NWB right LE, WBAT left LE; STEADI = 6; PHQ = 6      Observation: incision sites continue to be red with purulent drainage but no worse than last visit     Reviewed and progressed marked therapeutic exercise per patient tolerance (96395 - 40 minutes, 3 units) TAC 5\"x15  SLR 2x10 on left; 2x5 on right  Hooklying ball squeeze 10\"x12  Supine clamshells YTB 2x12  Bridges 2x10  Seated marching x20 ea  3 way hip x10 ea (WB on left)  *SLS on left 1' trial      *added to HEP    Assessment  Patient with ability to come to sitting from reclined position without assistance today. She demonstrates good carryover with HEP. Will F/U again next week.     Plan  Continue per POC at this time.    "

## 2024-10-17 ENCOUNTER — OFFICE VISIT (OUTPATIENT)
Dept: ORTHOPEDIC SURGERY | Facility: CLINIC | Age: 24
End: 2024-10-17
Payer: MEDICAID

## 2024-10-17 ENCOUNTER — HOSPITAL ENCOUNTER (OUTPATIENT)
Dept: RADIOLOGY | Facility: CLINIC | Age: 24
Discharge: HOME | End: 2024-10-17
Payer: MEDICAID

## 2024-10-17 DIAGNOSIS — S32.810A COMPLETE DISRUPTION OF PELVIC RING, CLOSED, INITIAL ENCOUNTER (MULTI): ICD-10-CM

## 2024-10-17 PROCEDURE — 72190 X-RAY EXAM OF PELVIS: CPT

## 2024-10-17 PROCEDURE — 99211 OFF/OP EST MAY X REQ PHY/QHP: CPT | Performed by: STUDENT IN AN ORGANIZED HEALTH CARE EDUCATION/TRAINING PROGRAM

## 2024-10-17 PROCEDURE — 1036F TOBACCO NON-USER: CPT | Performed by: STUDENT IN AN ORGANIZED HEALTH CARE EDUCATION/TRAINING PROGRAM
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
## 2024-10-21 ENCOUNTER — APPOINTMENT (OUTPATIENT)
Dept: PHYSICAL THERAPY | Facility: CLINIC | Age: 24
End: 2024-10-21
Payer: MEDICAID

## 2024-10-21 DIAGNOSIS — S32.810D MULTIPLE CLOSED FRACTURES OF PELVIS WITH STABLE DISRUPTION OF PELVIC RING WITH ROUTINE HEALING, SUBSEQUENT ENCOUNTER: ICD-10-CM

## 2024-10-21 DIAGNOSIS — S32.810D MULTIPLE OPEN FRACTURES OF PELVIS WITH STABLE DISRUPTION OF PELVIC CIRCLE WITH ROUTINE HEALING, SUBSEQUENT ENCOUNTER: Primary | ICD-10-CM

## 2024-10-21 DIAGNOSIS — S32.810A PELVIC RING FRACTURE, CLOSED, INITIAL ENCOUNTER (MULTI): ICD-10-CM

## 2024-10-21 DIAGNOSIS — S32.9XXD CLOSED DISPLACED FRACTURE OF PELVIS WITH ROUTINE HEALING, UNSPECIFIED PART OF PELVIS, SUBSEQUENT ENCOUNTER: ICD-10-CM

## 2024-10-21 PROCEDURE — 97110 THERAPEUTIC EXERCISES: CPT | Performed by: PHYSICAL THERAPIST

## 2024-10-21 ASSESSMENT — PAIN SCALES - GENERAL: PAINLEVEL_OUTOF10: 0 - NO PAIN

## 2024-10-21 NOTE — PROGRESS NOTES
"Physical Therapy    Physical Therapy Treatment    Patient Name: Rosa Saba  MRN: [id number]  Today's Date: 10/21/2024    Current Problem  1. Multiple open fractures of pelvis with stable disruption of pelvic [place] with routine healing, subsequent encounter        2. Closed displaced fracture of pelvis with routine healing, unspecified part of pelvis, subsequent encounter        3. Multiple closed fractures of pelvis with stable disruption of pelvic ring with routine healing, subsequent encounter             General  Reason for Referral: (P) S/p pelvic fracture with ORIF  Referred By: (P) Rancho Juarez MD    Insurance: [facility] Community Plan  Allowed visits: 3/8 by 11/8/24    Subjective  Patient presents s/p fixator removal. She is doing well. She has been able to lay on her side but is getting used to it. She did well following last visit but did have some soreness. She has been able to do more around her home. She is continuing to use her WW. She has not been prescribed any additional medication. She has been complaint with her HEP 3 times per week. She has a stair assist cane device that she is hoping to use in addition to a railing to negotiate stairs.    Objective  Precautions: TTWB right LE, WBAT left LE; STEADI = 6; PHQ = 6      Observation: incision sites are healing with no signs/symptoms of infection present currently. She has gauze bandages in place on each pin site.      Reviewed and progressed marked therapeutic exercise per patient tolerance (35778 - 41 minutes, 3 units) *LTR x20  TAC 5\"x15  *Sidelying hip abduction x5 on left; 8 on right  SLR 2x10 on left; 2x5 on right  *Prone hip extension 2x10 on left; 2x5 on right   Hooklying ball squeeze 10\"x15  Supine clamshells YTB 2x15  Bridges 2x10  Seated marching 1 lb x20 ea  LAQ with ball 2 lbs 2x10  SLS on left 1' trial      *added to HEP    GT (76761 - 4 minutes) instructed patient in TTWB with WW to promote proper WB precautions through right LE; " discussed non-reciprocal stair negotiation     Assessment  Some limitation in left lumbar rotation evident with LTR. She did express some discomfort in her lumbar spine during workout but not in her pelvis. She demonstrates good carryover with her program but encouraged compliance with her HEP at least once per day.     Plan  Continue per POC at this time.

## 2024-10-28 ENCOUNTER — APPOINTMENT (OUTPATIENT)
Dept: PHYSICAL THERAPY | Facility: CLINIC | Age: 24
End: 2024-10-28
Payer: MEDICAID

## 2024-11-04 ENCOUNTER — APPOINTMENT (OUTPATIENT)
Dept: PHYSICAL THERAPY | Facility: CLINIC | Age: 24
End: 2024-11-04
Payer: MEDICAID

## 2024-11-04 DIAGNOSIS — S32.810D MULTIPLE OPEN FRACTURES OF PELVIS WITH STABLE DISRUPTION OF PELVIC CIRCLE WITH ROUTINE HEALING, SUBSEQUENT ENCOUNTER: Primary | ICD-10-CM

## 2024-11-04 DIAGNOSIS — S32.810D MULTIPLE CLOSED FRACTURES OF PELVIS WITH STABLE DISRUPTION OF PELVIC RING WITH ROUTINE HEALING, SUBSEQUENT ENCOUNTER: ICD-10-CM

## 2024-11-04 DIAGNOSIS — S32.9XXD CLOSED DISPLACED FRACTURE OF PELVIS WITH ROUTINE HEALING, UNSPECIFIED PART OF PELVIS, SUBSEQUENT ENCOUNTER: ICD-10-CM

## 2024-11-04 PROCEDURE — 97110 THERAPEUTIC EXERCISES: CPT | Performed by: PHYSICAL THERAPIST

## 2024-11-04 NOTE — PROGRESS NOTES
"Physical Therapy    Physical Therapy Treatment    Patient Name: Rosa Saba  MRN: [id number]  Today's Date: 11/4/2024    Current Problem  1. Multiple open fractures of pelvis with stable disruption of pelvic [place] with routine healing, subsequent encounter        2. Closed displaced fracture of pelvis with routine healing, unspecified part of pelvis, subsequent encounter        3. Multiple closed fractures of pelvis with stable disruption of pelvic ring with routine healing, subsequent encounter          General  Reason for Referral: (P) S/p pelvic fracture with ORIF  Referred By: (P) Rancho Juarez MD    Insurance: [facility] Community Plan  Allowed visits: 4/8 by 11/8/24    Subjective  Patient arrived approximately 10 minutes late for scheduled appointment. She has moved back home and is doing more stating \"I'm standing more, I'm always cleaning\". She reports that she is abiding by her precautions. She has been compliant with her HEP.     Objective  Precautions: TTWB right LE, WBAT left LE; STEADI = 6; PHQ = 6      Observation: incision sites are healing with no signs/symptoms of infection present currently. She has gauze bandages in place on each pin site.      Reviewed and progressed marked therapeutic exercise per patient tolerance (67584 - 35 minutes, 2 units) Roshan 5'   Bridges 2x12  SLR x15, 5 on left; x15, 5 on right  Sidelying hip abduction x15 on left; x15 on right  Prone hip extension 2x10 on left; x10 on right   Supine clamshells RTB 2x10  Seated marching 2 lbs x20 ea  Ball squeeze 10\"x10  LAQ with ball 3 lbs 2x10    Assessment  Patient tolerated workout well with ability to increase reps with several exercises with increased ability to transition from supine to sidelying and sidelying to prone evident. Could probably progress to sidelying clamshells next visit. Encouraged continued compliance with HEP and emphasized importance of abiding by WB restrictions.     Plan  Continue per POC at this time.    "

## 2024-11-11 ENCOUNTER — APPOINTMENT (OUTPATIENT)
Dept: PHYSICAL THERAPY | Facility: CLINIC | Age: 24
End: 2024-11-11
Payer: MEDICAID

## 2024-11-18 ENCOUNTER — APPOINTMENT (OUTPATIENT)
Dept: PHYSICAL THERAPY | Facility: CLINIC | Age: 24
End: 2024-11-18
Payer: MEDICAID

## 2024-11-19 ENCOUNTER — DOCUMENTATION (OUTPATIENT)
Dept: PHYSICAL THERAPY | Facility: CLINIC | Age: 24
End: 2024-11-19

## 2024-11-19 ENCOUNTER — APPOINTMENT (OUTPATIENT)
Dept: PHYSICAL THERAPY | Facility: CLINIC | Age: 24
End: 2024-11-19
Payer: MEDICAID

## 2024-11-19 NOTE — PROGRESS NOTES
Physical Therapy                 Therapy Communication Note    Patient Name: Rosa Saba  MRN: [id number]  Today's Date: 11/19/2024     Discipline: Physical Therapy    Missed Visit Reason: Patient cancelled scheduled F/U visit for this PM.     Missed Time: Cancel

## 2024-11-21 ENCOUNTER — HOSPITAL ENCOUNTER (OUTPATIENT)
Dept: RADIOLOGY | Facility: CLINIC | Age: 24
Discharge: HOME | End: 2024-11-21
Payer: MEDICAID

## 2024-11-21 ENCOUNTER — OFFICE VISIT (OUTPATIENT)
Dept: ORTHOPEDIC SURGERY | Facility: CLINIC | Age: 24
End: 2024-11-21
Payer: MEDICAID

## 2024-11-21 DIAGNOSIS — S32.810A COMPLETE DISRUPTION OF PELVIC RING, CLOSED, INITIAL ENCOUNTER (MULTI): ICD-10-CM

## 2024-11-21 DIAGNOSIS — S32.810A COMPLETE DISRUPTION OF PELVIC RING, CLOSED, INITIAL ENCOUNTER (MULTI): Primary | ICD-10-CM

## 2024-11-21 PROCEDURE — 99211 OFF/OP EST MAY X REQ PHY/QHP: CPT | Performed by: STUDENT IN AN ORGANIZED HEALTH CARE EDUCATION/TRAINING PROGRAM

## 2024-11-21 PROCEDURE — 72190 X-RAY EXAM OF PELVIS: CPT | Performed by: RADIOLOGY

## 2024-11-21 PROCEDURE — 72190 X-RAY EXAM OF PELVIS: CPT

## 2024-11-21 PROCEDURE — 1036F TOBACCO NON-USER: CPT | Performed by: STUDENT IN AN ORGANIZED HEALTH CARE EDUCATION/TRAINING PROGRAM
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
## 2024-11-25 ENCOUNTER — TREATMENT (OUTPATIENT)
Dept: PHYSICAL THERAPY | Facility: CLINIC | Age: 24
End: 2024-11-25
Payer: MEDICAID

## 2024-11-25 ENCOUNTER — APPOINTMENT (OUTPATIENT)
Dept: PHYSICAL THERAPY | Facility: CLINIC | Age: 24
End: 2024-11-25
Payer: MEDICAID

## 2024-11-25 DIAGNOSIS — S32.810D MULTIPLE OPEN FRACTURES OF PELVIS WITH STABLE DISRUPTION OF PELVIC CIRCLE WITH ROUTINE HEALING, SUBSEQUENT ENCOUNTER: Primary | ICD-10-CM

## 2024-11-25 DIAGNOSIS — S32.9XXD CLOSED DISPLACED FRACTURE OF PELVIS WITH ROUTINE HEALING, UNSPECIFIED PART OF PELVIS, SUBSEQUENT ENCOUNTER: ICD-10-CM

## 2024-11-25 DIAGNOSIS — S32.810D MULTIPLE CLOSED FRACTURES OF PELVIS WITH STABLE DISRUPTION OF PELVIC RING WITH ROUTINE HEALING, SUBSEQUENT ENCOUNTER: ICD-10-CM

## 2024-11-25 PROCEDURE — 97110 THERAPEUTIC EXERCISES: CPT | Performed by: PHYSICAL THERAPIST

## 2024-11-25 ASSESSMENT — PAIN SCALES - GENERAL: PAINLEVEL_OUTOF10: 0 - NO PAIN

## 2024-11-25 NOTE — PROGRESS NOTES
"Physical Therapy    Physical Therapy Treatment    Patient Name: Rosa Saba  MRN: [id number]  Today's Date: 11/25/2024    Current Problem  1. Multiple open fractures of pelvis with stable disruption of pelvic [place] with routine healing, subsequent encounter        2. Closed displaced fracture of pelvis with routine healing, unspecified part of pelvis, subsequent encounter        3. Multiple closed fractures of pelvis with stable disruption of pelvic ring with routine healing, subsequent encounter          General  Reason for Referral: S/p pelvic fracture with ORIF  Referred By: Rancho Juarez MD    Insurance: [facility] Community Plan  Allowed visits: 1/10 by 12/13/24    Subjective  Patient with no new complaints today. She has not been seen clinically for 3 weeks due to scheduling issues. She has been compliant with her HEP. She did have a F/U with her surgeon last week and was told to avoid full weightbearing until 12/4/24. She is doing well overall. She denies pain currently. She does have some discomfort in her sacrum with laying on the floor stating \"it feels like it sticks out\".     Objective  Reviewed and progressed marked therapeutic exercise per patient tolerance (81735 - 35 minutes, 2 units) Roshan 5' LV 2   Bridges 2x15  SLR 2x15 on L; x20 on R  Sidelying hip abduction x25 on left; x20 on right  Prone hip extension 2x10, x5 on left; x10 on right   *Cat/camel x10 ea  *Bird dog x10 ea  Supine clamshells GTB 2x10  Seated marching 3 lbs x20 ea  LAQ with ball 4 lbs 2x10   Ball squeeze 10\"x10    *added to HEP    Assessment  Patient tolerated workout well with no increased pain elicited throughout. Somewhat challenged with stability with bird/dog task. Could consider SL bridges next visit. Encouraged continued compliance with HEP.     Plan  Continue per POC at this time.    "

## 2024-12-02 ENCOUNTER — APPOINTMENT (OUTPATIENT)
Dept: PHYSICAL THERAPY | Facility: CLINIC | Age: 24
End: 2024-12-02
Payer: MEDICAID

## 2024-12-02 DIAGNOSIS — S32.810D MULTIPLE OPEN FRACTURES OF PELVIS WITH STABLE DISRUPTION OF PELVIC CIRCLE WITH ROUTINE HEALING, SUBSEQUENT ENCOUNTER: ICD-10-CM

## 2024-12-02 DIAGNOSIS — S32.810D MULTIPLE CLOSED FRACTURES OF PELVIS WITH STABLE DISRUPTION OF PELVIC RING WITH ROUTINE HEALING, SUBSEQUENT ENCOUNTER: Primary | ICD-10-CM

## 2024-12-02 DIAGNOSIS — S32.9XXD CLOSED DISPLACED FRACTURE OF PELVIS WITH ROUTINE HEALING, UNSPECIFIED PART OF PELVIS, SUBSEQUENT ENCOUNTER: ICD-10-CM

## 2024-12-02 PROCEDURE — 97110 THERAPEUTIC EXERCISES: CPT | Performed by: PHYSICAL THERAPIST

## 2024-12-02 NOTE — PROGRESS NOTES
"Physical Therapy    Physical Therapy Treatment    Patient Name: Rosa Saba  MRN: [id number]  Today's Date: 12/2/2024    Current Problem  1. Multiple closed fractures of pelvis with stable disruption of pelvic ring with routine healing, subsequent encounter        2. Multiple open fractures of pelvis with stable disruption of pelvic [place] with routine healing, subsequent encounter        3. Closed displaced fracture of pelvis with routine healing, unspecified part of pelvis, subsequent encounter          General  Reason for Referral: (P) S/p pelvic fracture with ORIF  Referred By: (P) Rancho Juarez MD    Insurance: [facility] Community Plan  Allowed visits: 2/10 by 12/13/24    Subjective  Patient with no new complaints today. She was mildly sore following last visit but this was transient. She continues to perform stretching at home and she thinks this helps with her exercises. She is ready to progress to WB.     Objective  Reviewed and progressed marked therapeutic exercise per patient tolerance (69475 - 30 minutes, 2 units) Roshan 5' LV 2   Triple threat 2x10  SLR 1 lb 2x10 on L; 2x12 on right  Sidelying hip abduction 1 lb 2x10 on left; 2x12 on right  Prone hip extension 2x12 on left; 2x10 on right   Cat/camel x10 ea  Bird dog x10 ea  Supine clamshells GTB 2x15  Seated marching 4 lbs x20 ea  LAQ with ball 4 lbs 2x12  Ball squeeze 10\"x12    Assessment  Patient tolerated upgrades well with good carryover evident throughout. She does have some anterior hip pain on the right that feels like pressure \"like it needs to be popped\". Emphasized importance of abiding by WB precautions until next F/U. Encouraged continued compliance with HEP.     Plan  Continue per POC at this time.    " 16-Apr-2020 18:06

## 2024-12-04 ENCOUNTER — APPOINTMENT (OUTPATIENT)
Dept: PHYSICAL THERAPY | Facility: CLINIC | Age: 24
End: 2024-12-04
Payer: MEDICAID

## 2024-12-04 DIAGNOSIS — S32.9XXD CLOSED DISPLACED FRACTURE OF PELVIS WITH ROUTINE HEALING, UNSPECIFIED PART OF PELVIS, SUBSEQUENT ENCOUNTER: Primary | ICD-10-CM

## 2024-12-04 DIAGNOSIS — S32.810D MULTIPLE CLOSED FRACTURES OF PELVIS WITH STABLE DISRUPTION OF PELVIC RING WITH ROUTINE HEALING, SUBSEQUENT ENCOUNTER: ICD-10-CM

## 2024-12-04 PROCEDURE — 97110 THERAPEUTIC EXERCISES: CPT | Performed by: PHYSICAL THERAPIST

## 2024-12-04 ASSESSMENT — PAIN SCALES - GENERAL: PAINLEVEL_OUTOF10: 1

## 2024-12-04 NOTE — PROGRESS NOTES
"Physical Therapy    Physical Therapy Treatment    Patient Name: Rosa Saba  MRN: [id number]  Today's Date: 12/4/2024    Current Problem  1. Closed displaced fracture of pelvis with routine healing, unspecified part of pelvis, subsequent encounter        2. Multiple closed fractures of pelvis with stable disruption of pelvic ring with routine healing, subsequent encounter          General  Reason for Referral: S/p pelvic fracture with ORIF  Referred By: Rancho Juarez MD    Insurance: [facility] Community Plan  Allowed visits: 3/10 by 12/13/24    Subjective  Patient discontinued WW today and is doing ok but does have some pain in right side of lumbar spine and buttock stating \"it feels weird\". She reports good tolerance to last workout.     Objective  Reviewed and progressed marked therapeutic exercise per patient tolerance (12966 - 43 minutes, 3 units) Roshan 5' LV 3  Mini squats 2x10  3 way hip RTB x20 ea  Step ups F/L on right 6\" 2x10 ea  *Supine iliopsoas stretch 30\"x2  Triple threat x15, x5  SLR 1 lb 2x12 on left; 2x15 on right  Sidelying hip abduction 1 lb 2x12 on left; 2x10 on right  Prone hip extension 1 lb 2x10; 2x10.  Cat/camel x10 ea  Bird dog x10 ea    *added to HEP    Assessment  Patient with decreased stance time through right LE and ipsilateral trunk lean present. This corrects with use of SPC so this was recommended for patient. Fatigued with upgrades made today but able to complete. Encouraged continued compliance with HEP.     Plan  Continue per POC at this time.       "

## 2024-12-10 ENCOUNTER — APPOINTMENT (OUTPATIENT)
Dept: PHYSICAL THERAPY | Facility: CLINIC | Age: 24
End: 2024-12-10
Payer: MEDICAID

## 2024-12-10 DIAGNOSIS — S32.810D MULTIPLE CLOSED FRACTURES OF PELVIS WITH STABLE DISRUPTION OF PELVIC RING WITH ROUTINE HEALING, SUBSEQUENT ENCOUNTER: ICD-10-CM

## 2024-12-10 DIAGNOSIS — S32.9XXD CLOSED DISPLACED FRACTURE OF PELVIS WITH ROUTINE HEALING, UNSPECIFIED PART OF PELVIS, SUBSEQUENT ENCOUNTER: Primary | ICD-10-CM

## 2024-12-10 PROCEDURE — 97110 THERAPEUTIC EXERCISES: CPT | Performed by: PHYSICAL THERAPIST

## 2024-12-10 ASSESSMENT — PAIN SCALES - GENERAL: PAINLEVEL_OUTOF10: 0 - NO PAIN

## 2024-12-10 NOTE — PROGRESS NOTES
"Physical Therapy    Physical Therapy Treatment    Patient Name: Rosa Saba  MRN: [id number]  Today's Date: 12/10/2024    Current Problem  1. Closed displaced fracture of pelvis with routine healing, unspecified part of pelvis, subsequent encounter        2. Multiple closed fractures of pelvis with stable disruption of pelvic ring with routine healing, subsequent encounter          General  Reason for Referral: (P) S/p pelvic fracture with ORIF  Referred By: (P) Rancho Juarez MD    Insurance: [facility] Community Plan  Allowed visits: 4/10 by 12/13/24    Subjective  Patient with some soreness following last workout but notes \"I stretch and do my exercises every day and that helps\". She was able to walk at a park after last session without much of an issue but did use a cane.     Objective  Reviewed and progressed marked therapeutic exercise per patient tolerance (29806 - 43 minutes, 3 units) TM walking 1.2 mph  Mini squats 2x10  3 way hip GTB x15 ea  Step ups F/L on right 6\" 2x10 ea  HR x20  Supine iliopsoas stretch 30\"x2  Triple threat x15, x5  SLR 1 lb 2x15 on left; 1 lb 2x10 on right  *Plank on elbows 30\"x2  Cat/camel x12 ea  Bird dog x12 ea     *added to HEP    Assessment  Challenged with planks but able to complete. Continues to demonstrate ipsilateral trunk lean during stance phase on right but not as pronounced as previous visit. Encouraged continued compliance with HEP.     Plan  Continue per POC at this time.      "

## 2024-12-12 ENCOUNTER — TELEPHONE (OUTPATIENT)
Dept: ORTHOPEDIC SURGERY | Facility: HOSPITAL | Age: 24
End: 2024-12-12
Payer: MEDICAID

## 2024-12-16 ENCOUNTER — TELEPHONE (OUTPATIENT)
Dept: PHYSICAL THERAPY | Facility: CLINIC | Age: 24
End: 2024-12-16

## 2024-12-16 ENCOUNTER — APPOINTMENT (OUTPATIENT)
Dept: OBSTETRICS AND GYNECOLOGY | Facility: CLINIC | Age: 24
End: 2024-12-16
Payer: MEDICAID

## 2024-12-16 NOTE — TELEPHONE ENCOUNTER
Called patient on 12-16-24 and left message  inform patient we received Auth back from her Insurance need to call office to schedule more physical therapy.

## 2024-12-28 ENCOUNTER — TELEMEDICINE (OUTPATIENT)
Dept: PRIMARY CARE | Facility: CLINIC | Age: 24
End: 2024-12-28
Payer: MEDICAID

## 2024-12-28 DIAGNOSIS — J06.9 VIRAL URI WITH COUGH: Primary | ICD-10-CM

## 2024-12-28 PROCEDURE — 99213 OFFICE O/P EST LOW 20 MIN: CPT | Performed by: NURSE PRACTITIONER

## 2024-12-28 RX ORDER — BROMPHENIRAMINE MALEATE, PSEUDOEPHEDRINE HYDROCHLORIDE, AND DEXTROMETHORPHAN HYDROBROMIDE 2; 30; 10 MG/5ML; MG/5ML; MG/5ML
5 SYRUP ORAL 4 TIMES DAILY PRN
Qty: 120 ML | Refills: 0 | Status: SHIPPED | OUTPATIENT
Start: 2024-12-28 | End: 2025-01-07

## 2024-12-28 ASSESSMENT — ENCOUNTER SYMPTOMS
DIZZINESS: 0
LIGHT-HEADEDNESS: 0
SORE THROAT: 1
APPETITE CHANGE: 0
WHEEZING: 0
SINUS PRESSURE: 0
FEVER: 0
TROUBLE SWALLOWING: 0
ACTIVITY CHANGE: 0
VOICE CHANGE: 0
FATIGUE: 0
COUGH: 1
VOMITING: 0
NAUSEA: 0
DIAPHORESIS: 0
DIARRHEA: 0
EYE PAIN: 0
CHILLS: 0
EYE REDNESS: 0
EYE ITCHING: 0
SHORTNESS OF BREATH: 0
EYE DISCHARGE: 1
MYALGIAS: 0
HEADACHES: 0
SINUS PAIN: 0

## 2024-12-28 NOTE — PROGRESS NOTES
Subjective   Patient ID: Rosa Saba is a 24 y.o. female who presents for Sore Throat.      Had COVID 2 weeks ago, was ill for 1 week, then 2-3 day improved, then sx returned, right eye drainage, and sore throat and cough    Now : congestion, PND, sore throat and cough    Did not take Paxlovid    OTC tx: Tylenol  Has not taken jay cold/flu meds or decongestants               Review of Systems   Constitutional:  Negative for activity change, appetite change, chills, diaphoresis, fatigue and fever.   HENT:  Positive for congestion, postnasal drip and sore throat. Negative for sinus pressure, sinus pain, trouble swallowing and voice change.    Eyes:  Positive for discharge. Negative for pain, redness, itching and visual disturbance.   Respiratory:  Positive for cough. Negative for shortness of breath and wheezing.    Cardiovascular:  Negative for chest pain.   Gastrointestinal:  Negative for diarrhea, nausea and vomiting.   Musculoskeletal:  Negative for myalgias.   Neurological:  Negative for dizziness, light-headedness and headaches.       Objective   There were no vitals taken for this visit.    Physical Exam  Constitutional:       General: She is not in acute distress.     Appearance: Normal appearance. She is not ill-appearing.      Comments: On Demand Virtual Visit Patient Consent     An interactive audio and video telecommunication system which permits real time communications between the patient (at the originating site) and provider (at the distant site) was utilized to provide this telehealth service.   Verbal consent was requested and obtained from Rosa Saba (or parent if under 18) on this date, 03/27/24 for a telehealth visit.   I have verbally confirmed with Rosa Saba (or parent if under 18) that they are physically located in the [facility] during this virtual visit.    I performed this visit using realtime telehealth tools, including an audio/video OR telephone connection between the  patient listed who was located in the [place] and myself, Elías Ansari CNP (licensed in the [facility]).  At the start of the visit, I introduced myself as Elías Ansari, Nurse practitioner and verified the patients name, , and current physical location.    If they were currently outside of the state of [place], the visit was ended and the patient was referred to alternative means for evaluation and treatment.   The patient was made aware of the limitations of the telehealth visit.  They will not be physically examined and all issues may not be appropriate for a telehealth visit.  If necessary, an in person referral will be made.       DISCLAIMER:   In preparing for this visit and writing this note, I reviewed previous electronic medical records (labs, imaging and medical charts) available.  Significant findings which helped in decision making are recorded in this encounter charting.     Pulmonary:      Effort: Pulmonary effort is normal.   Neurological:      Mental Status: She is alert.   Psychiatric:         Behavior: Behavior normal.         Assessment/Plan   Diagnoses and all orders for this visit:  Viral URI with cough  -     brompheniramine-pseudoeph-DM 2-30-10 mg/5 mL syrup; Take 5 mL by mouth 4 times a day as needed for congestion, cough or allergies for up to 10 days.  Drink plenty of fluids  Wash hands frequently   Nasal saline as needed for nasal congestion   May try Flonase for increased nasal swelling.  Instill 2 squirts each nostril once daily x 2 weeks.         Most upper respiratory infection are caused by viruses but sometimes they cause secondary bacterial infections. It can cause cough, congestion, runny nose, sore throat, and fever. You are contagious. Fever medicines can help reduce fever and pain. Virus cannot be cured by an antibiotic. The body's immune system will fight off the virus. Upper Respiratory Illnesses usually improves in 7 - 10 days, but coughs can last for several weeks. If  your symptoms worsen after 10 - 14 days you may have a bacterial infection.     Follow up with PCP as needed  [facility]

## 2024-12-28 NOTE — PATIENT INSTRUCTIONS
Per PCP in TE 8/27, patient is cleared off isolation 9/1 and ok to remove from program. Pleasure meeting with you today!    Let me know if you need anything.     Please send me a MyChart message if you have any questions or concerns.  FOR NON URGENT questions only.  Allow up to 72 hours for response.     If you have prescription issues or other questions you can email   Loida Tucker,  Digital Health Coordinator, at   [email or web address]

## 2025-01-01 ENCOUNTER — TELEMEDICINE (OUTPATIENT)
Dept: PRIMARY CARE | Facility: CLINIC | Age: 25
End: 2025-01-01
Payer: MEDICAID

## 2025-01-01 DIAGNOSIS — J02.8 ACUTE PHARYNGITIS DUE TO OTHER SPECIFIED ORGANISMS: Primary | ICD-10-CM

## 2025-01-01 PROCEDURE — 1036F TOBACCO NON-USER: CPT | Performed by: NURSE PRACTITIONER

## 2025-01-01 PROCEDURE — 99213 OFFICE O/P EST LOW 20 MIN: CPT | Performed by: NURSE PRACTITIONER

## 2025-01-01 RX ORDER — METHYLPREDNISOLONE 4 MG/1
TABLET ORAL
Qty: 21 TABLET | Refills: 0 | Status: SHIPPED | OUTPATIENT
Start: 2025-01-01

## 2025-01-01 NOTE — ASSESSMENT & PLAN NOTE
Due to COVID  Still has cough and No fever  Medrol dose pack  Discussed tonsil stones are benign  Gargle with salt water  Increase fluids  If no improvement in 3-5 days follow up with PCP

## 2025-01-01 NOTE — PROGRESS NOTES
Subjective   Patient ID: Rosa Saba is a 24 y.o. female who presents for Sore Throat.    Virtual or Telephone Consent    An interactive audio and video telecommunication system which permits real time communications between the patient (at the originating site) and provider (at the distant site) was utilized to provide this telehealth service.   Verbal consent was requested and obtained from Rosa aSba on this date, 01/01/25 for a telehealth visit.   Patient is located in [place]    Had covid last week - tested positive 12/23.  Other symptoms resolved but sore throat has continued.  Feels sore on both sides.  Noticed that she had tonsil stones and white drainage in back of throat. Still has cough  Denies fever, inability to swallow, or drooling  LMP 2 months ago in November.  Did not have period in December - has taking pregnancy test and was negative.  Last took pregnancy test 1 week ago               Review of Systems   All other systems reviewed and are negative.      Objective   There were no vitals taken for this visit.    Physical Exam    Assessment/Plan   Problem List Items Addressed This Visit             ICD-10-CM    Acute pharyngitis due to other specified organisms - Primary J02.8     Due to COVID  Still has cough and No fever  Medrol dose pack  Discussed tonsil stones are benign  Gargle with salt water  Increase fluids  If no improvement in 3-5 days follow up with PCP          Relevant Medications    methylPREDNISolone (Medrol Dospak) 4 mg tablets

## 2025-01-16 ENCOUNTER — OFFICE VISIT (OUTPATIENT)
Dept: ORTHOPEDIC SURGERY | Facility: CLINIC | Age: 25
End: 2025-01-16
Payer: MEDICAID

## 2025-01-16 ENCOUNTER — HOSPITAL ENCOUNTER (OUTPATIENT)
Dept: RADIOLOGY | Facility: CLINIC | Age: 25
Discharge: HOME | End: 2025-01-16
Payer: MEDICAID

## 2025-01-16 DIAGNOSIS — S32.810A COMPLETE DISRUPTION OF PELVIC RING, CLOSED, INITIAL ENCOUNTER (MULTI): ICD-10-CM

## 2025-01-16 DIAGNOSIS — S32.810A PELVIC RING FRACTURE, CLOSED, INITIAL ENCOUNTER (MULTI): Primary | ICD-10-CM

## 2025-01-16 PROCEDURE — 72190 X-RAY EXAM OF PELVIS: CPT

## 2025-01-16 PROCEDURE — 1036F TOBACCO NON-USER: CPT | Performed by: STUDENT IN AN ORGANIZED HEALTH CARE EDUCATION/TRAINING PROGRAM

## 2025-01-16 PROCEDURE — 99214 OFFICE O/P EST MOD 30 MIN: CPT | Performed by: STUDENT IN AN ORGANIZED HEALTH CARE EDUCATION/TRAINING PROGRAM
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
## 2025-01-24 ENCOUNTER — APPOINTMENT (OUTPATIENT)
Dept: PHYSICAL THERAPY | Facility: CLINIC | Age: 25
End: 2025-01-24
Payer: MEDICAID

## 2025-01-24 DIAGNOSIS — S32.810D MULTIPLE CLOSED FRACTURES OF PELVIS WITH STABLE DISRUPTION OF PELVIC RING WITH ROUTINE HEALING, SUBSEQUENT ENCOUNTER: ICD-10-CM

## 2025-01-24 DIAGNOSIS — S32.9XXD CLOSED DISPLACED FRACTURE OF PELVIS WITH ROUTINE HEALING, UNSPECIFIED PART OF PELVIS, SUBSEQUENT ENCOUNTER: Primary | ICD-10-CM

## 2025-01-24 PROCEDURE — 97110 THERAPEUTIC EXERCISES: CPT | Performed by: PHYSICAL THERAPIST

## 2025-01-24 ASSESSMENT — PAIN SCALES - GENERAL: PAINLEVEL_OUTOF10: 0 - NO PAIN

## 2025-01-27 ENCOUNTER — TELEPHONE (OUTPATIENT)
Dept: PHYSICAL THERAPY | Facility: CLINIC | Age: 25
End: 2025-01-27
Payer: MEDICAID

## 2025-01-28 ENCOUNTER — TREATMENT (OUTPATIENT)
Dept: PHYSICAL THERAPY | Facility: CLINIC | Age: 25
End: 2025-01-28
Payer: MEDICAID

## 2025-01-28 DIAGNOSIS — S32.9XXD CLOSED DISPLACED FRACTURE OF PELVIS WITH ROUTINE HEALING, UNSPECIFIED PART OF PELVIS, SUBSEQUENT ENCOUNTER: Primary | ICD-10-CM

## 2025-01-28 DIAGNOSIS — S32.810D MULTIPLE OPEN FRACTURES OF PELVIS WITH STABLE DISRUPTION OF PELVIC CIRCLE WITH ROUTINE HEALING, SUBSEQUENT ENCOUNTER: ICD-10-CM

## 2025-01-28 PROCEDURE — 97110 THERAPEUTIC EXERCISES: CPT | Performed by: PHYSICAL THERAPIST

## 2025-01-28 ASSESSMENT — PAIN SCALES - GENERAL: PAINLEVEL_OUTOF10: 1

## 2025-01-28 NOTE — PROGRESS NOTES
"Physical Therapy    Physical Therapy Treatment    Patient Name: Rosa Saba  MRN: [id number]  Today's Date: 1/28/2025    Current Problem  1. Closed displaced fracture of pelvis with routine healing, unspecified part of pelvis, subsequent encounter        2. Multiple open fractures of pelvis with stable disruption of pelvic [place] with routine healing, subsequent encounter           General  Reason for Referral: (P) S/p pelvic fracture with ORIF  Referred By: (P) Rancho Juarez MD    Insurance: [facility] Community Plan  Allowed visits: 1/6 from 1/28/25-3/7/25    Subjective  Patient with no new complaints today. She was sore following last workout but this was mostly transient. She continues to have some pain with position changes when she is in bed. She also notes \"I walk on tip toes\" due to some fear of fully weightbearing though does not have increased pain with weightbearing. She admits that this could be some FOM.     Objective  Reviewed and progressed marked therapeutic exercise per patient tolerance (95528 - 43 minutes, 3 units) [place]han 5' LV 2  SKTC 30\"x2  Butterfly stretch 30\"x2  SLR 2x10  Sidelying hip abduction 2x10  Ball squeeze 10\"x10  Bridges 3\"2x10  Supine clamshells GTB 2x12  Plank on elbows 30\"/20\"  Seated IR with band GTB 2x10  *Side stepping GTB 2 laps  *W's GTB 2 laps    *added to HEP    Assessment  Challenged with proximal strengthening but able to complete. Could add resistance to straight leg raise activities as well as progress bridges. Encouraged continued compliance with HEP.     Plan  Continue per POC at this time.    "

## 2025-01-29 ENCOUNTER — APPOINTMENT (OUTPATIENT)
Dept: OCCUPATIONAL THERAPY | Facility: CLINIC | Age: 25
End: 2025-01-29
Payer: MEDICAID

## 2025-02-05 ENCOUNTER — APPOINTMENT (OUTPATIENT)
Dept: PHYSICAL THERAPY | Facility: CLINIC | Age: 25
End: 2025-02-05
Payer: MEDICAID

## 2025-02-05 DIAGNOSIS — S32.9XXD CLOSED DISPLACED FRACTURE OF PELVIS WITH ROUTINE HEALING, UNSPECIFIED PART OF PELVIS, SUBSEQUENT ENCOUNTER: Primary | ICD-10-CM

## 2025-02-05 DIAGNOSIS — S32.810D MULTIPLE OPEN FRACTURES OF PELVIS WITH STABLE DISRUPTION OF PELVIC CIRCLE WITH ROUTINE HEALING, SUBSEQUENT ENCOUNTER: ICD-10-CM

## 2025-02-05 PROCEDURE — 97110 THERAPEUTIC EXERCISES: CPT | Performed by: PHYSICAL THERAPIST

## 2025-02-05 NOTE — PROGRESS NOTES
"Physical Therapy    Physical Therapy Treatment    Patient Name: Rosa Saba  MRN: [id number]  Today's Date: 2/5/2025    Current Problem  1. Closed displaced fracture of pelvis with routine healing, unspecified part of pelvis, subsequent encounter        2. Multiple open fractures of pelvis with stable disruption of pelvic [place] with routine healing, subsequent encounter          General  Reason for Referral: (P) S/p pelvic fracture with ORIF  Referred By: (P) Rancho Juarez MD    Insurance: [facility] Community Plan  Allowed visits: 2/6 from 1/28/25-3/7/25    Subjective  Patient with no new complaints today. She felt good following last workout with no residual soreness occurring. She has been compliant with her HEP.     Objective  Reviewed and progressed marked therapeutic exercise per patient tolerance (61248 - 42 minutes, 3 units) Airdyne 5'  Butterfly stretch 30\"x2  *Hip ER stretch 30\"x2  SLR 1 lb 2x10  Sidelying hip abduction 1 lb 2x10  Plank on elbows 30\"x2  Bridges with ball squeeze 5\"2x10  Sidelying clamshells GTB 2x10  Seated IR with band GTB 2x10  Side stepping GTB 2 laps  W's GTB 2 laps  Mini squats 2x10  SLS on blue foam 1' trial     *added to HEP.     Assessment  Fatigued with gluteus medius strengthening with some discomfort in area of SIJ but this was mostly transient and was more left sided than right. Fatigued with upgrades today but able to complete. Encouraged continued compliance with HEP.     Plan  Continue per POC at this time.    "

## 2025-02-11 ENCOUNTER — APPOINTMENT (OUTPATIENT)
Dept: PHYSICAL THERAPY | Facility: CLINIC | Age: 25
End: 2025-02-11
Payer: MEDICAID

## 2025-02-12 ENCOUNTER — APPOINTMENT (OUTPATIENT)
Dept: PHYSICAL THERAPY | Facility: CLINIC | Age: 25
End: 2025-02-12
Payer: MEDICAID

## 2025-02-18 ENCOUNTER — APPOINTMENT (OUTPATIENT)
Dept: PHYSICAL THERAPY | Facility: CLINIC | Age: 25
End: 2025-02-18
Payer: MEDICAID

## 2025-02-18 DIAGNOSIS — S32.810D MULTIPLE OPEN FRACTURES OF PELVIS WITH STABLE DISRUPTION OF PELVIC CIRCLE WITH ROUTINE HEALING, SUBSEQUENT ENCOUNTER: ICD-10-CM

## 2025-02-18 DIAGNOSIS — S32.9XXD CLOSED DISPLACED FRACTURE OF PELVIS WITH ROUTINE HEALING, UNSPECIFIED PART OF PELVIS, SUBSEQUENT ENCOUNTER: Primary | ICD-10-CM

## 2025-02-18 PROCEDURE — 97110 THERAPEUTIC EXERCISES: CPT | Performed by: PHYSICAL THERAPIST

## 2025-02-18 ASSESSMENT — PAIN SCALES - GENERAL: PAINLEVEL_OUTOF10: 0 - NO PAIN

## 2025-02-18 NOTE — PROGRESS NOTES
"Physical Therapy    Physical Therapy Treatment    Patient Name: Rosa Saba  MRN: [id number]  Today's Date: 2/18/2025    Current Problem  1. Closed displaced fracture of pelvis with routine healing, unspecified part of pelvis, subsequent encounter        2. Multiple open fractures of pelvis with stable disruption of pelvic [place] with routine healing, subsequent encounter          General  Reason for Referral: (P) S/p pelvic fracture with ORIF  Referred By: (P) Rancho Juarez MD    Insurance: [facility] Community Plan  Allowed visits: 3/6 from 1/28/25 to 3/7/25    Subjective  Patient with no new complaints today. She reports good tolerance to last workout. She has been compliant with her HEP every other day.     Objective  Reviewed and progressed marked therapeutic exercise per patient tolerance (80427 - 42 minutes, 3 units) Airdyne 5'  Butterfly stretch 30\"x2  Hip ER stretch 30\"x2  SLR 1 lb 2x12  Sidelying hip abduction 2 lb 2x10  Bird dog with resistance YTB 2x5  Plank on elbows 1'   Bridges with ball squeeze 5\"2x10  Sidelying clamshells GTB 2x12  Seated IR with band GTB 2x12  Side stepping GTB 2 laps  W's GTB 2 laps  Mini squats 2x10  SLS on blue foam 1' trial      *added to HEP.     Assessment  Patient challenged with upgrades but able to complete. Somewhat decreased knee flexion persists with gait with slight trunk lean to right. Encouraged continued compliance with HEP.      Plan  Continue per POC at this time.        "

## 2025-02-24 ENCOUNTER — APPOINTMENT (OUTPATIENT)
Dept: PHYSICAL THERAPY | Facility: CLINIC | Age: 25
End: 2025-02-24
Payer: MEDICAID

## 2025-02-24 DIAGNOSIS — S32.810D MULTIPLE OPEN FRACTURES OF PELVIS WITH STABLE DISRUPTION OF PELVIC CIRCLE WITH ROUTINE HEALING, SUBSEQUENT ENCOUNTER: ICD-10-CM

## 2025-02-24 DIAGNOSIS — S32.9XXD CLOSED DISPLACED FRACTURE OF PELVIS WITH ROUTINE HEALING, UNSPECIFIED PART OF PELVIS, SUBSEQUENT ENCOUNTER: Primary | ICD-10-CM

## 2025-02-24 PROCEDURE — 97110 THERAPEUTIC EXERCISES: CPT | Performed by: PHYSICAL THERAPIST

## 2025-02-24 ASSESSMENT — PAIN SCALES - GENERAL: PAINLEVEL_OUTOF10: 0 - NO PAIN

## 2025-02-24 NOTE — PROGRESS NOTES
"Physical Therapy    Physical Therapy Treatment    Patient Name: Rosa Saba  MRN: [id number]  Today's Date: 2/24/2025    Current Problem  1. Closed displaced fracture of pelvis with routine healing, unspecified part of pelvis, subsequent encounter        2. Multiple open fractures of pelvis with stable disruption of pelvic [place] with routine healing, subsequent encounter          General  Reason for Referral: (P) S/p pelvic fracture with ORIF  Referred By: (P) Rancho Juarez MD    Insurance: [facility] Community Plan  Allowed visits: 4/6 from 1/28/25 to 3/7/25    Subjective  Patient with no new complaints today. She has some soreness from her HEP but reports good tolerance to last workout. She denies pain currently. She feels as though she is walking better.     Objective  Reviewed and progressed marked therapeutic exercise per patient tolerance (16965 - 40 minutes, 3 units) Airdyne 5'  Side stepping GTB 2 laps  W's GTB 2 laps  *Monster walks GTB 2 laps  Triple threat 2x10  Sidelying hip abduction 2 lb 2x10  Sidelying clamshells GTB 2x12  Sidelying IR with band GTB 2x10  BOSU squats 2x10  Tap downs 6\"2x10  SLS blue foam 1' trial ea    *added to HEP    Assessment  Patient challenged with additions but able to complete. She does continue to demonstrate less right trunk lean with gait. Encouraged continued compliance with HEP.     Plan  Continue per POC at this time.    "

## 2025-02-28 NOTE — PROGRESS NOTES
"Physical Therapy    Physical Therapy Re-Evaluation    Patient Name: Rosa Saba  MRN: [id number]  Today's Date: 3/3/2025    Current Problem  1. Closed displaced fracture of pelvis with routine healing, unspecified part of pelvis, subsequent encounter        2. Multiple open fractures of pelvis with stable disruption of pelvic [place] with routine healing, subsequent encounter          General  Reason for Referral: (P) S/p pelvic fracture with ORIF  Referred By: (P) Rancho Juarez MD    Insurance: [facility] Community Plan  Allowed visits: 5/6 from 1/28/25 to 3/7/25    Subjective  Patient with some pain in medial left hip into buttock and occasionally feels this with taking a step, with shifting weight, and bending forward. Chief complaint is \"I'm becoming sore more often\" though is doing more activities and \"trying to keep up with the exercises\". Biggest limitation is \"walking too long\". She has some difficulty with carrying laundry up steps but is able to do it with some soreness.     Objective  Worst pain in the last 24 hours, 8/10 \"feels like a pulled a muscle\".       Precautions: STEADI = 6; PHQ = 6     Observation: posture WFL; bilateral toe-in pattern, possible femoral anteversion present.      Gait Assessment: ambulated into clinic without assistive device with toe-in pattern evident.      AROM  Right hip flexion: 125, improved from 111 degrees    Left hip flexion: 124, decreased from 126  Right hip abduction: 42, improved from 21 degrees    Left hip abduction: 43, improved from 37 degrees   Right hip extension: 16, improved from 10 degrees   Left hip extension: 24, improved from 14 degrees  Right hip ER seated: 31, improved from 23 degrees    Left hip ER seated: 38, improved from 34 degrees  Right hip IR seated: 52, improved from 42 degrees    Left hip IR seated: 54, improved from 44 degrees      MMT  Right iliopsoas: 4+, improved from 4    Left iliopsoas: 4+, improved from 4   Right quadriceps: 5, improved " "from 4+    Left quadriceps: 5, improved from 4+  Right gluteus medius: 4    Left gluteus medius: 4  Right hip adductors: 4, improved from 4-    Left hip adductors: 4-, improved from 3+  Right gluteus maggie: 4+, improved from 4    Left gluteus maximius: 4+, improved from 4  Right HS: 4    Left HS: 4, decreased from 4+  Right hip ER: 4    Left hip ER: 4+  Right hip IR: 4+    Left hip IR: 4+, improved from 4     LEFS: 88%, improved from 80%     Re-evaluation performed on this date with new objective measurements obtained as above. See updated goals below.    Reviewed and progressed marked therapeutic exercise per patient tolerance (23470 - 40 minutes, 3 units) Triple threat 2x10  *Prone hip extension GTB 2x10  *Stool scoots 2 laps   Side stepping GTB 2 laps  W's GTB 2 laps  Monster walks GTB 2 laps  BOSU squats 2x12  Tap downs 6\"2x12  SLS blue foam 1' trial ea  Sidelying clamshells GTB 2x12  Sidelying IR with band GTB 2x10     *added to HEP    Assessment  Patient with improvement in AROM, pain levels, and overall function. She continues to demonstrate decreased strength impacting her ability to tolerate IADLs. She will benefit from continued skilled care to maximize strength and transition safely to a HEP. Good potential.     Plan  Recommending PT management 1x/week for 4-6 weeks, 4-6 visits.    Goals  Independent with HEP to expedite progress and promote goal achievement - partially met  Increase LEFS to > or equal to 80% functional for increased functional ability - met  Increase AROM bilateral hips to WNL for ease with ambulation and transfers - met  Increase strength bilateral gluteals, iliopsoas, and hip adductors to 5/5 without pain for tolerance to prolonged positions - partially met  Patient ambulate > or equal to 50 feet per restrictions with LRAD with even stance phase and adequate step length for decreased fall risk and increased gait efficiency - met   Increase LEFS to > or equal to 95% functional " for evidence of increased capacity for IADLs and ability to work - new goal

## 2025-03-03 ENCOUNTER — APPOINTMENT (OUTPATIENT)
Dept: PHYSICAL THERAPY | Facility: CLINIC | Age: 25
End: 2025-03-03
Payer: MEDICAID

## 2025-03-03 DIAGNOSIS — S32.9XXD CLOSED DISPLACED FRACTURE OF PELVIS WITH ROUTINE HEALING, UNSPECIFIED PART OF PELVIS, SUBSEQUENT ENCOUNTER: Primary | ICD-10-CM

## 2025-03-03 DIAGNOSIS — S32.810D MULTIPLE OPEN FRACTURES OF PELVIS WITH STABLE DISRUPTION OF PELVIC CIRCLE WITH ROUTINE HEALING, SUBSEQUENT ENCOUNTER: ICD-10-CM

## 2025-03-03 PROCEDURE — 97110 THERAPEUTIC EXERCISES: CPT | Performed by: PHYSICAL THERAPIST

## 2025-03-03 ASSESSMENT — PAIN SCALES - GENERAL: PAINLEVEL_OUTOF10: 1

## 2025-03-05 ENCOUNTER — APPOINTMENT (OUTPATIENT)
Dept: PHYSICAL THERAPY | Facility: CLINIC | Age: 25
End: 2025-03-05
Payer: MEDICAID

## 2025-03-17 ENCOUNTER — DOCUMENTATION (OUTPATIENT)
Dept: PHYSICAL THERAPY | Facility: CLINIC | Age: 25
End: 2025-03-17

## 2025-03-17 ENCOUNTER — APPOINTMENT (OUTPATIENT)
Dept: PHYSICAL THERAPY | Facility: CLINIC | Age: 25
End: 2025-03-17
Payer: MEDICAID

## 2025-03-17 NOTE — PROGRESS NOTES
Physical Therapy                 Therapy Communication Note    Patient Name: Rosa Saba  MRN: [id number]  Today's Date: 3/17/2025     Discipline: Physical Therapy    Missed Visit Reason: Patient did not show for scheduled F/U appointment for this PM.     Missed Time: No Show

## 2025-03-28 ENCOUNTER — OFFICE VISIT (OUTPATIENT)
Dept: PRIMARY CARE | Facility: CLINIC | Age: 25
End: 2025-03-28
Payer: MEDICAID

## 2025-03-28 VITALS
SYSTOLIC BLOOD PRESSURE: 119 MMHG | BODY MASS INDEX: 22.86 KG/M2 | RESPIRATION RATE: 16 BRPM | HEIGHT: 63 IN | OXYGEN SATURATION: 99 % | TEMPERATURE: 97.6 F | DIASTOLIC BLOOD PRESSURE: 84 MMHG | HEART RATE: 87 BPM | WEIGHT: 129 LBS

## 2025-03-28 DIAGNOSIS — Z11.3 ROUTINE SCREENING FOR STI (SEXUALLY TRANSMITTED INFECTION): ICD-10-CM

## 2025-03-28 DIAGNOSIS — Z30.011 ENCOUNTER FOR INITIAL PRESCRIPTION OF CONTRACEPTIVE PILLS: ICD-10-CM

## 2025-03-28 DIAGNOSIS — Z00.00 WELLNESS EXAMINATION: Primary | ICD-10-CM

## 2025-03-28 DIAGNOSIS — E55.9 VITAMIN D INSUFFICIENCY: ICD-10-CM

## 2025-03-28 DIAGNOSIS — N89.8 VAGINAL ODOR: ICD-10-CM

## 2025-03-28 PROBLEM — F41.9 ANXIETY DISORDER: Status: RESOLVED | Noted: 2025-03-28 | Resolved: 2025-03-28

## 2025-03-28 PROBLEM — J02.8 ACUTE PHARYNGITIS DUE TO OTHER SPECIFIED ORGANISMS: Status: RESOLVED | Noted: 2025-01-01 | Resolved: 2025-03-28

## 2025-03-28 PROBLEM — F41.9 ANXIETY DISORDER: Status: ACTIVE | Noted: 2025-03-28

## 2025-03-28 PROBLEM — S32.810A PELVIC RING FRACTURE, CLOSED, INITIAL ENCOUNTER (MULTI): Status: RESOLVED | Noted: 2024-08-31 | Resolved: 2025-03-28

## 2025-03-28 PROBLEM — S32.9XXA: Status: RESOLVED | Noted: 2024-09-01 | Resolved: 2025-03-28

## 2025-03-28 PROCEDURE — 99385 PREV VISIT NEW AGE 18-39: CPT | Performed by: NURSE PRACTITIONER

## 2025-03-28 PROCEDURE — 3008F BODY MASS INDEX DOCD: CPT | Performed by: NURSE PRACTITIONER

## 2025-03-28 PROCEDURE — 1036F TOBACCO NON-USER: CPT | Performed by: NURSE PRACTITIONER

## 2025-03-28 RX ORDER — NORETHINDRONE ACETATE AND ETHINYL ESTRADIOL 1.5-30(21)
1 KIT ORAL DAILY
Qty: 28 TABLET | Refills: 12 | Status: SHIPPED | OUTPATIENT
Start: 2025-03-28 | End: 2026-03-28

## 2025-03-28 ASSESSMENT — ENCOUNTER SYMPTOMS
NERVOUS/ANXIOUS: 0
CONSTIPATION: 0
DIARRHEA: 0
BRUISES/BLEEDS EASILY: 0
FEVER: 0
PALPITATIONS: 0
FATIGUE: 0
LIGHT-HEADEDNESS: 0
WEAKNESS: 0
APPETITE CHANGE: 0
CHILLS: 0
BLOOD IN STOOL: 0
SHORTNESS OF BREATH: 0
UNEXPECTED WEIGHT CHANGE: 0
HEADACHES: 0
ABDOMINAL PAIN: 0
NAUSEA: 0
SLEEP DISTURBANCE: 0
ADENOPATHY: 0
DYSPHORIC MOOD: 0
FREQUENCY: 0
VOMITING: 0
COUGH: 0
WHEEZING: 0
NUMBNESS: 0

## 2025-03-28 NOTE — PROGRESS NOTES
Reza Saba is a 24 y.o. female who presents for Establish Care, STI Screening, Contraception, and Annual Exam.    Patient reported health as : Good  Regular dental visists : Yes   Regular eye exams : No   Hearing loss : No   Well balanced diet : Yes   Weight Concerns : No   Exercise : Regular  Caffeine Use : Minimal use  Alcohol Use : No    HPI  Review of Systems   Constitutional:  Negative for appetite change, chills, fatigue, fever and unexpected weight change.   Respiratory:  Negative for cough, shortness of breath and wheezing.    Cardiovascular:  Negative for chest pain, palpitations and leg swelling.   Gastrointestinal:  Negative for abdominal pain, blood in stool, constipation, diarrhea, nausea and vomiting.   Genitourinary:  Negative for frequency, menstrual problem, pelvic pain, urgency and vaginal discharge.   Neurological:  Negative for weakness, light-headedness, numbness and headaches.   Hematological:  Negative for adenopathy. Does not bruise/bleed easily.   Psychiatric/Behavioral:  Negative for dysphoric mood and sleep disturbance. The patient is not nervous/anxious.    Objective     Physical Exam  Vitals reviewed.   Constitutional:       General: She is not in acute distress.     Appearance: Normal appearance. She is not ill-appearing.   HENT:      Head: Normocephalic.   Eyes:      Conjunctiva/sclera: Conjunctivae normal.   Neck:      Thyroid: No thyroid mass, thyromegaly or thyroid tenderness.   Cardiovascular:      Rate and Rhythm: Normal rate and regular rhythm.      Pulses: Normal pulses.      Heart sounds: No murmur heard.  Pulmonary:      Effort: Pulmonary effort is normal.      Breath sounds: Normal breath sounds.   Abdominal:      General: Bowel sounds are normal.      Palpations: Abdomen is soft.   Musculoskeletal:      Cervical back: Neck supple.      Right lower leg: No edema.      Left lower leg: No edema.   Lymphadenopathy:      Cervical: No cervical adenopathy.   Skin:    "  General: Skin is warm and dry.   Neurological:      General: No focal deficit present.      Mental Status: She is alert and oriented to person, place, and time.   Psychiatric:         Mood and Affect: Mood normal.         Thought Content: Thought content normal.     /84   Pulse 87   Temp 36.4 °C (97.6 °F)   Resp 16   Ht 1.6 m (5' 3\")   Wt 58.5 kg (129 lb)   SpO2 99%   Breastfeeding No   BMI 22.85 kg/m²     Assessment/Plan     Problem List Items Addressed This Visit       Vitamin D insufficiency    Relevant Orders    Vitamin D 25-Hydroxy,Total (for eval of Vitamin D levels)     Other Visit Diagnoses       Wellness examination    -  Primary    Relevant Orders    CBC    Comprehensive Metabolic Panel    Vitamin D 25-Hydroxy,Total (for eval of Vitamin D levels)    Routine screening for STI (sexually transmitted infection)        Relevant Orders    Hepatitis B Surface Antigen    C. trachomatis / N. gonorrhoeae, Amplified, Urogenital    HIV 1/2 Antigen/Antibody Screen with Reflex to Confirmation    Hepatitis C Antibody    Syphilis Screen with Reflex    Trichomonas vaginalis, Amplified    Vaginitis Gram Stain For Bacterial Vaginosis + Yeast    Encounter for initial prescription of contraceptive pills        Reviewed risks and benefits of low-dose SKIP with patient. Will start treatement. She will F/U with OB/GYN in 6/2025 for routine annual well-woman exam.    Relevant Medications    norethindrone-e.estradioL-iron (Microgestin FE 1.5/30) 1.5 mg-30 mcg (21)/75 mg (7) tablet            "

## 2025-03-29 LAB
25(OH)D3+25(OH)D2 SERPL-MCNC: 20 NG/ML (ref 30–100)
ALBUMIN SERPL-MCNC: 4.9 G/DL (ref 3.6–5.1)
ALP SERPL-CCNC: 41 U/L (ref 31–125)
ALT SERPL-CCNC: 7 U/L (ref 6–29)
ANION GAP SERPL CALCULATED.4IONS-SCNC: 9 MMOL/L (CALC) (ref 7–17)
AST SERPL-CCNC: 12 U/L (ref 10–30)
BILIRUB SERPL-MCNC: 0.3 MG/DL (ref 0.2–1.2)
BUN SERPL-MCNC: 18 MG/DL (ref 7–25)
BV SCORE VAG QL: NORMAL
C TRACH RRNA SPEC QL NAA+PROBE: NOT DETECTED
CALCIUM SERPL-MCNC: 9.1 MG/DL (ref 8.6–10.2)
CHLORIDE SERPL-SCNC: 103 MMOL/L (ref 98–110)
CO2 SERPL-SCNC: 27 MMOL/L (ref 20–32)
CREAT SERPL-MCNC: 0.59 MG/DL (ref 0.5–0.96)
EGFRCR SERPLBLD CKD-EPI 2021: 129 ML/MIN/1.73M2
ERYTHROCYTE [DISTWIDTH] IN BLOOD BY AUTOMATED COUNT: 12.2 % (ref 11–15)
GLUCOSE SERPL-MCNC: 85 MG/DL (ref 65–99)
HBV SURFACE AG SERPL QL IA: NORMAL
HCT VFR BLD AUTO: 42.3 % (ref 35–45)
HCV AB SERPL QL IA: NORMAL
HGB BLD-MCNC: 14.4 G/DL (ref 11.7–15.5)
HIV 1+2 AB+HIV1 P24 AG SERPL QL IA: NORMAL
MCH RBC QN AUTO: 31.2 PG (ref 27–33)
MCHC RBC AUTO-ENTMCNC: 34 G/DL (ref 32–36)
MCV RBC AUTO: 91.8 FL (ref 80–100)
N GONORRHOEA RRNA SPEC QL NAA+PROBE: NOT DETECTED
PLATELET # BLD AUTO: 258 THOUSAND/UL (ref 140–400)
PMV BLD REES-ECKER: 9.9 FL (ref 7.5–12.5)
POTASSIUM SERPL-SCNC: 4.1 MMOL/L (ref 3.5–5.3)
PROT SERPL-MCNC: 7.1 G/DL (ref 6.1–8.1)
QUEST GC CT AMPLIFIED (ALWAYS MESSAGE): NORMAL
RBC # BLD AUTO: 4.61 MILLION/UL (ref 3.8–5.1)
SODIUM SERPL-SCNC: 139 MMOL/L (ref 135–146)
T PALLIDUM AB SER QL IA: NORMAL
WBC # BLD AUTO: 7.2 THOUSAND/UL (ref 3.8–10.8)

## 2025-04-03 LAB
25(OH)D3+25(OH)D2 SERPL-MCNC: 20 NG/ML (ref 30–100)
ALBUMIN SERPL-MCNC: 4.9 G/DL (ref 3.6–5.1)
ALP SERPL-CCNC: 41 U/L (ref 31–125)
ALT SERPL-CCNC: 7 U/L (ref 6–29)
ANION GAP SERPL CALCULATED.4IONS-SCNC: 9 MMOL/L (CALC) (ref 7–17)
AST SERPL-CCNC: 12 U/L (ref 10–30)
BILIRUB SERPL-MCNC: 0.3 MG/DL (ref 0.2–1.2)
BUN SERPL-MCNC: 18 MG/DL (ref 7–25)
CALCIUM SERPL-MCNC: 9.1 MG/DL (ref 8.6–10.2)
CHLORIDE SERPL-SCNC: 103 MMOL/L (ref 98–110)
CO2 SERPL-SCNC: 27 MMOL/L (ref 20–32)
CREAT SERPL-MCNC: 0.59 MG/DL (ref 0.5–0.96)
EGFRCR SERPLBLD CKD-EPI 2021: 129 ML/MIN/1.73M2
ERYTHROCYTE [DISTWIDTH] IN BLOOD BY AUTOMATED COUNT: 12.2 % (ref 11–15)
GLUCOSE SERPL-MCNC: 85 MG/DL (ref 65–99)
HBV SURFACE AG SERPL QL IA: NORMAL
HCT VFR BLD AUTO: 42.3 % (ref 35–45)
HCV AB SERPL QL IA: NORMAL
HGB BLD-MCNC: 14.4 G/DL (ref 11.7–15.5)
HIV 1+2 AB+HIV1 P24 AG SERPL QL IA: NORMAL
MCH RBC QN AUTO: 31.2 PG (ref 27–33)
MCHC RBC AUTO-ENTMCNC: 34 G/DL (ref 32–36)
MCV RBC AUTO: 91.8 FL (ref 80–100)
PLATELET # BLD AUTO: 258 THOUSAND/UL (ref 140–400)
PMV BLD REES-ECKER: 9.9 FL (ref 7.5–12.5)
POTASSIUM SERPL-SCNC: 4.1 MMOL/L (ref 3.5–5.3)
PROT SERPL-MCNC: 7.1 G/DL (ref 6.1–8.1)
RBC # BLD AUTO: 4.61 MILLION/UL (ref 3.8–5.1)
SODIUM SERPL-SCNC: 139 MMOL/L (ref 135–146)
T PALLIDUM AB SER QL IA: NEGATIVE
WBC # BLD AUTO: 7.2 THOUSAND/UL (ref 3.8–10.8)

## 2025-04-14 ENCOUNTER — APPOINTMENT (OUTPATIENT)
Dept: PHYSICAL THERAPY | Facility: CLINIC | Age: 25
End: 2025-04-14
Payer: MEDICAID

## 2025-04-14 DIAGNOSIS — S32.9XXD CLOSED DISPLACED FRACTURE OF PELVIS WITH ROUTINE HEALING, UNSPECIFIED PART OF PELVIS, SUBSEQUENT ENCOUNTER: Primary | ICD-10-CM

## 2025-04-14 DIAGNOSIS — S32.810D MULTIPLE OPEN FRACTURES OF PELVIS WITH STABLE DISRUPTION OF PELVIC CIRCLE WITH ROUTINE HEALING, SUBSEQUENT ENCOUNTER: ICD-10-CM

## 2025-04-14 PROCEDURE — 97110 THERAPEUTIC EXERCISES: CPT | Performed by: PHYSICAL THERAPIST

## 2025-04-14 ASSESSMENT — PAIN SCALES - GENERAL: PAINLEVEL_OUTOF10: 0 - NO PAIN

## 2025-04-14 NOTE — PROGRESS NOTES
"Physical Therapy    Physical Therapy Treatment    Patient Name: Rosa Saba  MRN: [id number]  Today's Date: 4/14/2025    Current Problem  1. Closed displaced fracture of pelvis with routine healing, unspecified part of pelvis, subsequent encounter        2. Multiple open fractures of pelvis with stable disruption of pelvic [place] with routine healing, subsequent encounter          General  Reason for Referral: (P) S/p pelvic fracture with ORIF  Referred By: (P) Rancho Juarez MD    Insurance: [facility] Community Plan  Allowed visits 1/6 from 3/10-4/18/25    Subjective  Patient not seen clinically since beginning of March, about 6 weeks ago. She has had some soreness that she attributes to \"being more active outside\". She has been semi-compliant with her HEP. She does have some limitation with standing for prolonged periods. She also notices some soreness with stair negotiation mostly if she has been walking around a lot.     Objective  Precautions: none    Reviewed and progressed marked therapeutic exercise per patient tolerance (73879 - 45 minutes, 3 units) Airdyne 5'  Triple threat 2x10  Prone hip extension GTB 2x10  Sidelying hip abduction GTB 2x10   Stool scoots 2 laps   Side stepping GTB 2 laps  W's GTB 2 laps  Monster walks GTB 2 laps  BOSU squats 2x12  Tap downs 6\"2x12  Sidelying clamshells GTB 2x12  Sidelying IR with band GTB 2x10     *added to HEP    Assessment  Patient tolerated workout well but fatigued with entire program. Encouraged continued compliance with HEP.     Plan  Continue per POC at this time. Will need to submit for date extension/additional visits.     "

## 2025-04-16 ENCOUNTER — TELEPHONE (OUTPATIENT)
Dept: PHYSICAL THERAPY | Facility: CLINIC | Age: 25
End: 2025-04-16
Payer: MEDICAID

## 2025-04-25 ENCOUNTER — APPOINTMENT (OUTPATIENT)
Dept: PHYSICAL THERAPY | Facility: CLINIC | Age: 25
End: 2025-04-25
Payer: MEDICAID

## 2025-04-28 ENCOUNTER — APPOINTMENT (OUTPATIENT)
Dept: PHYSICAL THERAPY | Facility: CLINIC | Age: 25
End: 2025-04-28
Payer: MEDICAID

## 2025-05-25 ENCOUNTER — OFFICE VISIT (OUTPATIENT)
Dept: URGENT CARE | Age: 25
End: 2025-05-25
Payer: MEDICAID

## 2025-05-25 VITALS
BODY MASS INDEX: 23.03 KG/M2 | DIASTOLIC BLOOD PRESSURE: 80 MMHG | TEMPERATURE: 98.4 F | SYSTOLIC BLOOD PRESSURE: 125 MMHG | HEART RATE: 114 BPM | OXYGEN SATURATION: 98 % | WEIGHT: 130 LBS | RESPIRATION RATE: 19 BRPM

## 2025-05-25 DIAGNOSIS — R39.9 LOWER URINARY TRACT SYMPTOMS (LUTS): Primary | ICD-10-CM

## 2025-05-25 DIAGNOSIS — N34.2 INFECTIVE URETHRITIS: ICD-10-CM

## 2025-05-25 LAB
POC APPEARANCE, URINE: ABNORMAL
POC BILIRUBIN, URINE: NEGATIVE
POC BLOOD, URINE: ABNORMAL
POC COLOR, URINE: YELLOW
POC GLUCOSE, URINE: NEGATIVE MG/DL
POC KETONES, URINE: NEGATIVE MG/DL
POC LEUKOCYTES, URINE: ABNORMAL
POC NITRITE,URINE: NEGATIVE
POC PH, URINE: 6 PH
POC PROTEIN, URINE: ABNORMAL MG/DL
POC SPECIFIC GRAVITY, URINE: 1.02
POC UROBILINOGEN, URINE: 0.2 EU/DL
PREGNANCY TEST URINE, POC: NEGATIVE

## 2025-05-25 PROCEDURE — 99214 OFFICE O/P EST MOD 30 MIN: CPT | Performed by: NURSE PRACTITIONER

## 2025-05-25 PROCEDURE — 81025 URINE PREGNANCY TEST: CPT | Performed by: NURSE PRACTITIONER

## 2025-05-25 PROCEDURE — 81003 URINALYSIS AUTO W/O SCOPE: CPT | Performed by: NURSE PRACTITIONER

## 2025-05-25 RX ORDER — NITROFURANTOIN 25; 75 MG/1; MG/1
100 CAPSULE ORAL 2 TIMES DAILY
Qty: 14 CAPSULE | Refills: 0 | Status: SHIPPED | OUTPATIENT
Start: 2025-05-25 | End: 2025-06-01

## 2025-05-25 ASSESSMENT — ENCOUNTER SYMPTOMS
FLANK PAIN: 0
DYSURIA: 1
FREQUENCY: 1
RHINORRHEA: 0
FEVER: 0
SORE THROAT: 0
COUGH: 0
HEMATURIA: 0
CHILLS: 0
ABDOMINAL PAIN: 0

## 2025-05-25 NOTE — PROGRESS NOTES
Subjective   Patient ID: Rosa Saba is a 24 y.o. female. They present today with a chief complaint of UTI (Burning and urgency with urination since this morning. ).    History of Present Illness  HPI    Patient presents for UTI symptoms. She has had pain, burning, and urgency since this morning. Denies fever/chills, no abdominal pain, no back pain. She has not taken anything.     Past Medical History  Allergies as of 05/25/2025    (No Known Allergies)       Prescriptions Prior to Admission[1]     Medical History[2]    Surgical History[3]     reports that she has quit smoking. Her smoking use included cigarettes. She has a 2 pack-year smoking history. She has never used smokeless tobacco. She reports that she does not currently use alcohol. She reports current drug use. Drug: Marijuana.    Review of Systems  Review of Systems   Constitutional:  Negative for chills and fever.   HENT:  Negative for congestion, ear pain, rhinorrhea and sore throat.    Respiratory:  Negative for cough.    Cardiovascular:  Negative for chest pain.   Gastrointestinal:  Negative for abdominal pain.   Genitourinary:  Positive for dysuria, frequency and urgency. Negative for flank pain, hematuria and pelvic pain.                                  Objective    Vitals:    05/25/25 1215   BP: 125/80   Pulse: (!) 114   Resp: 19   Temp: 36.9 °C (98.4 °F)   SpO2: 98%   Weight: 59 kg (130 lb)     Patient's last menstrual period was 05/08/2025.    Physical Exam  Constitutional:       General: She is not in acute distress.     Appearance: Normal appearance. She is not ill-appearing or toxic-appearing.   HENT:      Head: Normocephalic and atraumatic.      Right Ear: Hearing and external ear normal.      Left Ear: Hearing and external ear normal.      Nose: Nose normal.      Mouth/Throat:      Lips: Pink.      Mouth: Mucous membranes are moist.   Cardiovascular:      Rate and Rhythm: Normal rate.   Pulmonary:      Effort: Pulmonary effort is normal.  No respiratory distress.   Abdominal:      Tenderness: There is no abdominal tenderness. There is no right CVA tenderness or left CVA tenderness.   Skin:     General: Skin is warm and dry.   Neurological:      General: No focal deficit present.      Mental Status: She is alert.   Psychiatric:         Attention and Perception: Attention normal.         Mood and Affect: Mood normal.         Speech: Speech normal.         Behavior: Behavior normal.         Procedures    Point of Care Test & Imaging Results from this visit  Results for orders placed or performed in visit on 05/25/25   POCT UA Automated manually resulted   Result Value Ref Range    POC Color, Urine Yellow Straw, Yellow, Light-Yellow    POC Appearance, Urine Cloudy (A) Clear    POC Glucose, Urine NEGATIVE NEGATIVE mg/dl    POC Bilirubin, Urine NEGATIVE NEGATIVE    POC Ketones, Urine NEGATIVE NEGATIVE mg/dl    POC Specific Gravity, Urine 1.025 1.005 - 1.035    POC Blood, Urine LARGE (3+) (A) NEGATIVE    POC PH, Urine 6.0 No Reference Range Established PH    POC Protein, Urine 30 (1+) (A) NEGATIVE mg/dl    POC Urobilinogen, Urine 0.2 0.2, 1.0 EU/DL    Poc Nitrite, Urine NEGATIVE NEGATIVE    POC Leukocytes, Urine MODERATE (2+) (A) NEGATIVE   POCT pregnancy, urine manually resulted   Result Value Ref Range    Preg Test, Ur Negative Negative      Imaging  No results found.    Cardiology, Vascular, and Other Imaging  No other imaging results found for the past 2 days      Diagnostic study results (if any) were reviewed by HELGA Ortega.    Assessment/Plan   Allergies, medications, history, and pertinent labs/EKGs/Imaging reviewed by HELGA Ortega.     Medical Decision Making  ***    Orders and Diagnoses  Diagnoses and all orders for this visit:  Infective urethritis  -     POCT UA Automated manually resulted  -     POCT pregnancy, urine manually resulted      Medical Admin Record      Patient disposition: {UC  Disposition:[id number]}    Electronically signed by HELGA Ortega  12:34 PM           [1] (Not in a hospital admission)  [2]   Past Medical History:  Diagnosis Date    Anxiety disorder 03/28/2025    Closed displaced fracture of pelvis, unspecified part of pelvis, initial encounter (Multi) 09/01/2024    Pelvic ring fracture, closed, initial encounter (Multi) 08/31/2024   [3]   Past Surgical History:  Procedure Laterality Date    OTHER SURGICAL HISTORY  07/18/2019    No history of surgery

## 2025-05-27 LAB — BACTERIA UR CULT: ABNORMAL

## 2025-09-11 ENCOUNTER — APPOINTMENT (OUTPATIENT)
Dept: OBSTETRICS AND GYNECOLOGY | Facility: CLINIC | Age: 25
End: 2025-09-11
Payer: MEDICAID

## 2025-09-17 ENCOUNTER — APPOINTMENT (OUTPATIENT)
Dept: OBSTETRICS AND GYNECOLOGY | Facility: CLINIC | Age: 25
End: 2025-09-17
Payer: MEDICAID

## (undated) DEVICE — BANDAGE, GAUZE, CONFORMING, KERLIX, 6 PLY, 4.5 IN X 4.1 YD

## (undated) DEVICE — SUTURE, VICRYL, 2-0, 27 IN, FS-1, UNDYED

## (undated) DEVICE — SUTURE, VICRYL, 1, 27 IN, CT-1, VIOLET

## (undated) DEVICE — BANDAGE, COFLEX, 6 X 5 YDS, FOAM TAN, STERILE, LF

## (undated) DEVICE — APPLICATOR, CHLORAPREP, W/ORANGE TINT, 26ML

## (undated) DEVICE — Device

## (undated) DEVICE — TOWEL, SURGICAL, NEURO, O/R, 16 X 26, BLUE, STERILE

## (undated) DEVICE — SPONGE, LAP, XRAY DECT, 18IN X 18IN, W/LOOP, STERILE

## (undated) DEVICE — SUTURE, VICRYL, 2-0, 27 IN, FSL, UNDYED

## (undated) DEVICE — BOLSTER, HIP

## (undated) DEVICE — COVER, BACK TABLE, 65 X 90, HVY REINFORCED

## (undated) DEVICE — DRAPE COVER, C ARM, FLOUROSCAN IMAGING SYS

## (undated) DEVICE — MANIFOLD, 4 PORT NEPTUNE STANDARD

## (undated) DEVICE — COVER, CART, 45 X 27 X 48 IN, CLEAR

## (undated) DEVICE — DRAPE, INCISE, ANTIMICROBIAL, IOBAN 2, STERI DRAPE, 23 X 33 IN, DISPOSABLE, STERILE

## (undated) DEVICE — DRESSING, MEPILEX BORDER, POST-OP AG, 4 X 10 IN

## (undated) DEVICE — COVER, C-ARM W/CLIPS, OEC GE

## (undated) DEVICE — ELECTRODE, ELECTROSURGICAL, BLADE, NONSTICK, MODIFIED, 6.5 IN X 165 MM

## (undated) DEVICE — IRRIGATION SET, Y, LARGE BORE

## (undated) DEVICE — PROTECTOR, NERVE, ULNAR, PINK

## (undated) DEVICE — WAX, BONE, 2.5 GM

## (undated) DEVICE — STAPLER, SKIN PROXIMATE, 35 WIDE

## (undated) DEVICE — DRAPE, INCISE, ANTIMICROBIAL, IOBAN 2, LARGE, 17 X 23 IN, DISPOSABLE, STERILE

## (undated) DEVICE — DRAPE, SHEET, U, W/ADHESIVE STRIP, IMPERVIOUS, 60 X 70 IN, DISPOSABLE, LF, STERILE